# Patient Record
Sex: FEMALE | Race: WHITE | Employment: FULL TIME | URBAN - METROPOLITAN AREA
[De-identification: names, ages, dates, MRNs, and addresses within clinical notes are randomized per-mention and may not be internally consistent; named-entity substitution may affect disease eponyms.]

---

## 2018-03-19 ENCOUNTER — OFFICE VISIT (OUTPATIENT)
Dept: FAMILY MEDICINE CLINIC | Age: 53
End: 2018-03-19
Payer: COMMERCIAL

## 2018-03-19 VITALS
OXYGEN SATURATION: 98 % | DIASTOLIC BLOOD PRESSURE: 62 MMHG | WEIGHT: 173 LBS | BODY MASS INDEX: 28.82 KG/M2 | HEIGHT: 65 IN | TEMPERATURE: 97.6 F | SYSTOLIC BLOOD PRESSURE: 118 MMHG | HEART RATE: 54 BPM

## 2018-03-19 DIAGNOSIS — F33.41 RECURRENT MAJOR DEPRESSIVE DISORDER, IN PARTIAL REMISSION (HCC): ICD-10-CM

## 2018-03-19 DIAGNOSIS — F41.9 ANXIETY: Primary | ICD-10-CM

## 2018-03-19 PROCEDURE — G8417 CALC BMI ABV UP PARAM F/U: HCPCS | Performed by: INTERNAL MEDICINE

## 2018-03-19 PROCEDURE — G8427 DOCREV CUR MEDS BY ELIG CLIN: HCPCS | Performed by: INTERNAL MEDICINE

## 2018-03-19 PROCEDURE — 3014F SCREEN MAMMO DOC REV: CPT | Performed by: INTERNAL MEDICINE

## 2018-03-19 PROCEDURE — 99213 OFFICE O/P EST LOW 20 MIN: CPT | Performed by: INTERNAL MEDICINE

## 2018-03-19 PROCEDURE — 1036F TOBACCO NON-USER: CPT | Performed by: INTERNAL MEDICINE

## 2018-03-19 PROCEDURE — 3017F COLORECTAL CA SCREEN DOC REV: CPT | Performed by: INTERNAL MEDICINE

## 2018-03-19 PROCEDURE — G8482 FLU IMMUNIZE ORDER/ADMIN: HCPCS | Performed by: INTERNAL MEDICINE

## 2018-03-19 NOTE — PATIENT INSTRUCTIONS
and a nervous feeling. In an anxiety disorder, the symptoms are far more severe. Constant worry, muscle tension, trouble sleeping, nausea and diarrhea, and other symptoms can make normal daily activities difficult or impossible. These symptoms may occur for no reason, and they can affect your work, school, or social life. Medicines, counseling, and self-care can all help. Follow-up care is a key part of your treatment and safety. Be sure to make and go to all appointments, and call your doctor if you are having problems. It's also a good idea to know your test results and keep a list of the medicines you take. How can you care for yourself at home? · Take medicines exactly as directed. Call your doctor if you think you are having a problem with your medicine. · Go to your counseling sessions and follow-up appointments. · Recognize and accept your anxiety. Then, when you are in a situation that makes you anxious, say to yourself, \"This is not an emergency. I feel uncomfortable, but I am not in danger. I can keep going even if I feel anxious. \"  · Be kind to your body:  ¨ Relieve tension with exercise or a massage. ¨ Get enough rest.  ¨ Avoid alcohol, caffeine, nicotine, and illegal drugs. They can increase your anxiety level and cause sleep problems. ¨ Learn and do relaxation techniques. See below for more about these techniques. · Engage your mind. Get out and do something you enjoy. Go to a funny movie, or take a walk or hike. Plan your day. Having too much or too little to do can make you anxious. · Keep a record of your symptoms. Discuss your fears with a good friend or family member, or join a support group for people with similar problems. Talking to others sometimes relieves stress. · Get involved in social groups, or volunteer to help others. Being alone sometimes makes things seem worse than they are. · Get at least 30 minutes of exercise on most days of the week to relieve stress.  Walking is a yourself or someone else. ? Keep the numbers for these national suicide hotlines: 7-476-850-TALK (2-356.402.4146) and 9-563-WNOXQVK (5-631.610.1416). If you or someone you know talks about suicide or feeling hopeless, get help right away. ? Watch closely for changes in your health, and be sure to contact your doctor if:  ? · You have anxiety or fear that affects your life. ? · You have symptoms of anxiety that are new or different from those you had before. Where can you learn more? Go to https://[employer]. org and sign in to your [employer] account. Enter P754 in the [address] [employer] box to learn more about \"Anxiety Disorder: Care Instructions. \"     If you do not have an account, please click on the \"Sign Up Now\" link. Current as of: May 12, 2017  Content Version: 11.5  © 6529-4602 Healthwise, Incorporated. Care instructions adapted under license by [facility] ([facility]). If you have questions about a medical condition or this instruction, always ask your healthcare professional. [address] any warranty or liability for your use of this information.

## 2018-04-16 RX ORDER — DICYCLOMINE HYDROCHLORIDE 10 MG/1
10 CAPSULE ORAL 4 TIMES DAILY
Qty: 120 CAPSULE | Refills: 1 | Status: CANCELLED | OUTPATIENT
Start: 2018-04-16

## 2018-04-26 ENCOUNTER — HOSPITAL ENCOUNTER (OUTPATIENT)
Age: 53
Discharge: HOME | End: 2018-04-28
Payer: COMMERCIAL

## 2018-04-26 DIAGNOSIS — R63.5 WEIGHT GAIN: ICD-10-CM

## 2018-04-26 DIAGNOSIS — N95.1 PERIMENOPAUSAL SYMPTOMS: ICD-10-CM

## 2018-04-26 DIAGNOSIS — N92.6 IRREGULAR BLEEDING: ICD-10-CM

## 2018-04-26 LAB
CORTISOL TOTAL: 6.36 MCG/DL (ref 2.68–18.4)
ESTRADIOL LEVEL: 96.9 PG/ML
FOLLICLE STIMULATING HORMONE: 34.2 MIU/ML
HBA1C MFR BLD: 5.2 % (ref 4.8–5.9)
HCT VFR BLD CALC: 41.6 % (ref 34–48)
HEMOGLOBIN: 13.4 G/DL (ref 11.5–15.5)
MCH RBC QN AUTO: 31.1 PG (ref 26–35)
MCHC RBC AUTO-ENTMCNC: 32.2 % (ref 32–34.5)
MCV RBC AUTO: 96.5 FL (ref 80–99.9)
PDW BLD-RTO: 12.5 FL (ref 11.5–15)
PLATELET # BLD: 251 E9/L (ref 130–450)
PMV BLD AUTO: 11.4 FL (ref 7–12)
PROLACTIN: 25.05 NG/ML
RBC # BLD: 4.31 E12/L (ref 3.5–5.5)
T3 FREE: 2.8 PG/ML (ref 2–4.4)
T4 FREE: 1.25 NG/DL (ref 0.93–1.7)
TSH SERPL DL<=0.05 MIU/L-ACNC: 2.57 UIU/ML (ref 0.27–4.2)
WBC # BLD: 6 E9/L (ref 4.5–11.5)

## 2018-04-26 PROCEDURE — 85027 COMPLETE CBC AUTOMATED: CPT

## 2018-04-26 PROCEDURE — 84439 ASSAY OF FREE THYROXINE: CPT

## 2018-04-26 PROCEDURE — 88175 CYTOPATH C/V AUTO FLUID REDO: CPT

## 2018-04-26 PROCEDURE — 82306 VITAMIN D 25 HYDROXY: CPT

## 2018-04-26 PROCEDURE — 82533 TOTAL CORTISOL: CPT

## 2018-04-26 PROCEDURE — 84481 FREE ASSAY (FT-3): CPT

## 2018-04-26 PROCEDURE — 83001 ASSAY OF GONADOTROPIN (FSH): CPT

## 2018-04-26 PROCEDURE — 84443 ASSAY THYROID STIM HORMONE: CPT

## 2018-04-26 PROCEDURE — 84144 ASSAY OF PROGESTERONE: CPT

## 2018-04-26 PROCEDURE — 84270 ASSAY OF SEX HORMONE GLOBUL: CPT

## 2018-04-26 PROCEDURE — 83036 HEMOGLOBIN GLYCOSYLATED A1C: CPT

## 2018-04-26 PROCEDURE — 84146 ASSAY OF PROLACTIN: CPT

## 2018-04-26 PROCEDURE — 82670 ASSAY OF TOTAL ESTRADIOL: CPT

## 2018-04-26 PROCEDURE — 84403 ASSAY OF TOTAL TESTOSTERONE: CPT

## 2018-04-27 LAB — VITAMIN D 25-HYDROXY: 83 NG/ML (ref 30–100)

## 2018-04-28 LAB
PROGESTERONE LEVEL: 0.45 NG/ML
SEX HORMONE BINDING GLOBULIN: 131 NMOL/L (ref 30–135)
TESTOSTERONE FREE-NONMALE: <1 PG/ML (ref 0.6–3.8)
TESTOSTERONE TOTAL: 8 NG/DL (ref 20–70)

## 2018-05-30 ENCOUNTER — HOSPITAL ENCOUNTER (OUTPATIENT)
Age: 53
Discharge: HOME | End: 2018-06-01
Payer: COMMERCIAL

## 2018-05-30 DIAGNOSIS — Z32.02 PREGNANCY TEST NEGATIVE: ICD-10-CM

## 2018-05-30 PROCEDURE — 88305 TISSUE EXAM BY PATHOLOGIST: CPT

## 2018-05-30 PROCEDURE — 84146 ASSAY OF PROLACTIN: CPT

## 2018-05-31 LAB — PROLACTIN: 10.99 NG/ML

## 2018-06-25 ENCOUNTER — OFFICE VISIT (OUTPATIENT)
Dept: FAMILY MEDICINE CLINIC | Age: 53
End: 2018-06-25
Payer: COMMERCIAL

## 2018-06-25 VITALS
HEIGHT: 65 IN | SYSTOLIC BLOOD PRESSURE: 110 MMHG | BODY MASS INDEX: 28.49 KG/M2 | RESPIRATION RATE: 16 BRPM | OXYGEN SATURATION: 96 % | WEIGHT: 171 LBS | DIASTOLIC BLOOD PRESSURE: 68 MMHG | TEMPERATURE: 97.7 F | HEART RATE: 65 BPM

## 2018-06-25 DIAGNOSIS — F33.9 RECURRENT MAJOR DEPRESSIVE DISORDER, REMISSION STATUS UNSPECIFIED (HCC): ICD-10-CM

## 2018-06-25 PROCEDURE — 3017F COLORECTAL CA SCREEN DOC REV: CPT | Performed by: INTERNAL MEDICINE

## 2018-06-25 PROCEDURE — 1036F TOBACCO NON-USER: CPT | Performed by: INTERNAL MEDICINE

## 2018-06-25 PROCEDURE — G8417 CALC BMI ABV UP PARAM F/U: HCPCS | Performed by: INTERNAL MEDICINE

## 2018-06-25 PROCEDURE — 99213 OFFICE O/P EST LOW 20 MIN: CPT | Performed by: INTERNAL MEDICINE

## 2018-06-25 PROCEDURE — G8427 DOCREV CUR MEDS BY ELIG CLIN: HCPCS | Performed by: INTERNAL MEDICINE

## 2018-06-25 RX ORDER — ESCITALOPRAM OXALATE 10 MG/1
TABLET ORAL
Qty: 30 TABLET | Refills: 2 | Status: SHIPPED | OUTPATIENT
Start: 2018-06-25 | End: 2018-10-27 | Stop reason: SDUPTHER

## 2018-08-27 ENCOUNTER — OFFICE VISIT (OUTPATIENT)
Dept: FAMILY MEDICINE CLINIC | Age: 53
End: 2018-08-27
Payer: COMMERCIAL

## 2018-08-27 VITALS
HEIGHT: 65 IN | TEMPERATURE: 98.1 F | WEIGHT: 170 LBS | BODY MASS INDEX: 28.32 KG/M2 | HEART RATE: 55 BPM | OXYGEN SATURATION: 98 % | SYSTOLIC BLOOD PRESSURE: 112 MMHG | RESPIRATION RATE: 18 BRPM | DIASTOLIC BLOOD PRESSURE: 64 MMHG

## 2018-08-27 DIAGNOSIS — Z01.818 PRE-OP EXAMINATION: Primary | ICD-10-CM

## 2018-08-27 DIAGNOSIS — R94.31 ABNORMAL FINDING ON EKG: Primary | ICD-10-CM

## 2018-08-27 DIAGNOSIS — F32.89 OTHER DEPRESSION: ICD-10-CM

## 2018-08-27 PROCEDURE — G8427 DOCREV CUR MEDS BY ELIG CLIN: HCPCS | Performed by: INTERNAL MEDICINE

## 2018-08-27 PROCEDURE — 1036F TOBACCO NON-USER: CPT | Performed by: INTERNAL MEDICINE

## 2018-08-27 PROCEDURE — 3017F COLORECTAL CA SCREEN DOC REV: CPT | Performed by: INTERNAL MEDICINE

## 2018-08-27 PROCEDURE — G8417 CALC BMI ABV UP PARAM F/U: HCPCS | Performed by: INTERNAL MEDICINE

## 2018-08-27 PROCEDURE — 93000 ELECTROCARDIOGRAM COMPLETE: CPT | Performed by: INTERNAL MEDICINE

## 2018-08-27 PROCEDURE — 99213 OFFICE O/P EST LOW 20 MIN: CPT | Performed by: INTERNAL MEDICINE

## 2018-08-27 NOTE — PROGRESS NOTES
Patient:  Kalli Pavon  MRN: [id number]  Date of Service: 2018   1965      CHIEF COMPLAINT:    Chief Complaint   Patient presents with    Pre-op Exam     surgical clearance for d&c on        History Obtained From:  patient    HISTORY OF PRESENT ILLNESS:   The patient is a 46 y.o. female with prior history of Depression and anxiety is here for a pre op clearance for   Her upcoming Gyn surgery by Dr Giuliana Jimenes. She is to undergo a [email or web address] procedure next month and patient reports finding of Fibroid. No chest pain. No shortness of braeth. No abdominal pain. Past medical, surgical and family history reviewed and updated. Medications, allergies, and social history reviewed and updated. ROS:  Negative except for her complaints regarding her menstrual periods. Seeing Dr Giuliana Jimenes OB/Gyn specialist.    Physical Exam:      General appearance: alert, appears stated age and cooperative  Vitals:   Vitals:    18 1740   BP: 112/64   Pulse: 55   Resp: 18   Temp: 98.1 °F (36.7 °C)   TempSrc: Oral   SpO2: 98%   Weight: 170 lb (77.1 kg)   Height: 5' 5\" (1.651 m)     Skin: Skin color, texture, turgor normal. No rashes or lesions. HEENT: Head: Normocephalic, no lesions, without obvious abnormality. Head: Normal, normocephalic, atraumatic. Eye: Normal external eye, conjunctiva, lids cornea, ROSCOE. Ears: Normal TM's bilaterally. Normal auditory canals and external ears. Non-tender. Nose: Normal external nose, mucus membranes and septum. Neck / Thyroid: Supple, no masses, nodes, nodules or enlargement.   Neck: no adenopathy, no carotid bruit, no JVD, supple, symmetrical, trachea midline and thyroid not enlarged, symmetric, no tenderness/mass/nodules  Lungs: clear to auscultation bilaterally  Heart: regular rate and rhythm, S1, S2 normal, no murmur, click, rub or gallop  Abdomen: soft, non-tender; bowel sounds normal; no masses,  no organomegaly  Extremities: extremities normal, atraumatic, no cyanosis or procedure    Labs reviewed with patient. Medications reviewed with patient. All questions answered. Return to clinic in 4 months.     Thiagodoni Lester  7:36 PM  8/27/2018

## 2018-10-27 DIAGNOSIS — F33.9 RECURRENT MAJOR DEPRESSIVE DISORDER, REMISSION STATUS UNSPECIFIED (HCC): ICD-10-CM

## 2018-10-29 RX ORDER — ESCITALOPRAM OXALATE 10 MG/1
TABLET ORAL
Qty: 30 TABLET | Refills: 2 | Status: SHIPPED | OUTPATIENT
Start: 2018-10-29 | End: 2019-01-24 | Stop reason: SDUPTHER

## 2019-01-14 ENCOUNTER — OFFICE VISIT (OUTPATIENT)
Dept: FAMILY MEDICINE CLINIC | Age: 54
End: 2019-01-14
Payer: COMMERCIAL

## 2019-01-14 VITALS
SYSTOLIC BLOOD PRESSURE: 118 MMHG | RESPIRATION RATE: 16 BRPM | DIASTOLIC BLOOD PRESSURE: 66 MMHG | OXYGEN SATURATION: 97 % | HEART RATE: 61 BPM | BODY MASS INDEX: 27 KG/M2 | WEIGHT: 168 LBS | TEMPERATURE: 98.1 F | HEIGHT: 66 IN

## 2019-01-14 DIAGNOSIS — Z23 NEED FOR PNEUMOCOCCAL VACCINATION: ICD-10-CM

## 2019-01-14 DIAGNOSIS — F32.89 OTHER DEPRESSION: Primary | ICD-10-CM

## 2019-01-14 PROCEDURE — G8427 DOCREV CUR MEDS BY ELIG CLIN: HCPCS | Performed by: INTERNAL MEDICINE

## 2019-01-14 PROCEDURE — 90471 IMMUNIZATION ADMIN: CPT | Performed by: INTERNAL MEDICINE

## 2019-01-14 PROCEDURE — 99213 OFFICE O/P EST LOW 20 MIN: CPT | Performed by: INTERNAL MEDICINE

## 2019-01-14 PROCEDURE — 90732 PPSV23 VACC 2 YRS+ SUBQ/IM: CPT | Performed by: INTERNAL MEDICINE

## 2019-01-14 PROCEDURE — 1036F TOBACCO NON-USER: CPT | Performed by: INTERNAL MEDICINE

## 2019-01-14 PROCEDURE — G8417 CALC BMI ABV UP PARAM F/U: HCPCS | Performed by: INTERNAL MEDICINE

## 2019-01-14 PROCEDURE — 3017F COLORECTAL CA SCREEN DOC REV: CPT | Performed by: INTERNAL MEDICINE

## 2019-01-14 PROCEDURE — G8484 FLU IMMUNIZE NO ADMIN: HCPCS | Performed by: INTERNAL MEDICINE

## 2019-01-24 DIAGNOSIS — F33.9 RECURRENT MAJOR DEPRESSIVE DISORDER, REMISSION STATUS UNSPECIFIED (HCC): ICD-10-CM

## 2019-01-25 RX ORDER — ESCITALOPRAM OXALATE 10 MG/1
TABLET ORAL
Qty: 30 TABLET | Refills: 2 | Status: SHIPPED | OUTPATIENT
Start: 2019-01-25 | End: 2019-06-10

## 2019-02-18 ENCOUNTER — TELEPHONE (OUTPATIENT)
Dept: FAMILY MEDICINE CLINIC | Age: 54
End: 2019-02-18

## 2019-03-15 ENCOUNTER — HOSPITAL ENCOUNTER (OUTPATIENT)
Age: 54
Discharge: HOME | End: 2019-03-15
Payer: COMMERCIAL

## 2019-03-15 ENCOUNTER — OFFICE VISIT (OUTPATIENT)
Dept: FAMILY MEDICINE CLINIC | Age: 54
End: 2019-03-15
Payer: COMMERCIAL

## 2019-03-15 VITALS
HEART RATE: 73 BPM | WEIGHT: 155 LBS | TEMPERATURE: 98 F | DIASTOLIC BLOOD PRESSURE: 70 MMHG | BODY MASS INDEX: 24.91 KG/M2 | SYSTOLIC BLOOD PRESSURE: 110 MMHG | HEIGHT: 66 IN | OXYGEN SATURATION: 98 %

## 2019-03-15 DIAGNOSIS — E78.5 HYPERLIPIDEMIA, UNSPECIFIED HYPERLIPIDEMIA TYPE: ICD-10-CM

## 2019-03-15 DIAGNOSIS — F32.89 OTHER DEPRESSION: Primary | ICD-10-CM

## 2019-03-15 DIAGNOSIS — Z12.39 SCREENING FOR BREAST CANCER: ICD-10-CM

## 2019-03-15 DIAGNOSIS — F32.89 OTHER DEPRESSION: ICD-10-CM

## 2019-03-15 LAB
ALBUMIN SERPL-MCNC: 4.8 G/DL (ref 3.5–5.2)
ALP BLD-CCNC: 46 U/L (ref 35–104)
ALT SERPL-CCNC: 18 U/L (ref 0–32)
ANION GAP SERPL CALCULATED.3IONS-SCNC: 14 MMOL/L (ref 7–16)
AST SERPL-CCNC: 22 U/L (ref 0–31)
BASOPHILS ABSOLUTE: 0.09 E9/L (ref 0–0.2)
BASOPHILS RELATIVE PERCENT: 1.8 % (ref 0–2)
BILIRUB SERPL-MCNC: 0.7 MG/DL (ref 0–1.2)
BUN BLDV-MCNC: 13 MG/DL (ref 6–20)
CALCIUM SERPL-MCNC: 10.1 MG/DL (ref 8.6–10.2)
CHLORIDE BLD-SCNC: 102 MMOL/L (ref 98–107)
CHOLESTEROL, TOTAL: 212 MG/DL (ref 0–199)
CO2: 26 MMOL/L (ref 22–29)
CREAT SERPL-MCNC: 0.8 MG/DL (ref 0.5–1)
EOSINOPHILS ABSOLUTE: 0.1 E9/L (ref 0.05–0.5)
EOSINOPHILS RELATIVE PERCENT: 2 % (ref 0–6)
GFR AFRICAN AMERICAN: >60
GFR NON-AFRICAN AMERICAN: >60 ML/MIN/1.73
GLUCOSE BLD-MCNC: 97 MG/DL (ref 74–99)
HCT VFR BLD CALC: 45.4 % (ref 34–48)
HDLC SERPL-MCNC: 82 MG/DL
HEMOGLOBIN: 15 G/DL (ref 11.5–15.5)
IMMATURE GRANULOCYTES #: 0.01 E9/L
IMMATURE GRANULOCYTES %: 0.2 % (ref 0–5)
LDL CHOLESTEROL CALCULATED: 119 MG/DL (ref 0–99)
LYMPHOCYTES ABSOLUTE: 1.27 E9/L (ref 1.5–4)
LYMPHOCYTES RELATIVE PERCENT: 25.2 % (ref 20–42)
MCH RBC QN AUTO: 31.6 PG (ref 26–35)
MCHC RBC AUTO-ENTMCNC: 33 % (ref 32–34.5)
MCV RBC AUTO: 95.8 FL (ref 80–99.9)
MONOCYTES ABSOLUTE: 0.59 E9/L (ref 0.1–0.95)
MONOCYTES RELATIVE PERCENT: 11.7 % (ref 2–12)
NEUTROPHILS ABSOLUTE: 2.98 E9/L (ref 1.8–7.3)
NEUTROPHILS RELATIVE PERCENT: 59.1 % (ref 43–80)
PDW BLD-RTO: 12.4 FL (ref 11.5–15)
PLATELET # BLD: 231 E9/L (ref 130–450)
PMV BLD AUTO: 10.3 FL (ref 7–12)
POTASSIUM SERPL-SCNC: 4.2 MMOL/L (ref 3.5–5)
RBC # BLD: 4.74 E12/L (ref 3.5–5.5)
SODIUM BLD-SCNC: 142 MMOL/L (ref 132–146)
TOTAL PROTEIN: 7.8 G/DL (ref 6.4–8.3)
TRIGL SERPL-MCNC: 57 MG/DL (ref 0–149)
VLDLC SERPL CALC-MCNC: 11 MG/DL
WBC # BLD: 5 E9/L (ref 4.5–11.5)

## 2019-03-15 PROCEDURE — G8484 FLU IMMUNIZE NO ADMIN: HCPCS | Performed by: INTERNAL MEDICINE

## 2019-03-15 PROCEDURE — 1036F TOBACCO NON-USER: CPT | Performed by: INTERNAL MEDICINE

## 2019-03-15 PROCEDURE — 3017F COLORECTAL CA SCREEN DOC REV: CPT | Performed by: INTERNAL MEDICINE

## 2019-03-15 PROCEDURE — 99213 OFFICE O/P EST LOW 20 MIN: CPT | Performed by: INTERNAL MEDICINE

## 2019-03-15 PROCEDURE — 80061 LIPID PANEL: CPT

## 2019-03-15 PROCEDURE — 85025 COMPLETE CBC W/AUTO DIFF WBC: CPT

## 2019-03-15 PROCEDURE — 80053 COMPREHEN METABOLIC PANEL: CPT

## 2019-03-15 PROCEDURE — 36415 COLL VENOUS BLD VENIPUNCTURE: CPT

## 2019-03-15 PROCEDURE — G8417 CALC BMI ABV UP PARAM F/U: HCPCS | Performed by: INTERNAL MEDICINE

## 2019-03-15 PROCEDURE — G8428 CUR MEDS NOT DOCUMENT: HCPCS | Performed by: INTERNAL MEDICINE

## 2019-03-17 DIAGNOSIS — F32.89 OTHER DEPRESSION: Primary | ICD-10-CM

## 2019-03-21 ENCOUNTER — HOSPITAL ENCOUNTER (OUTPATIENT)
Dept: GENERAL RADIOLOGY | Age: 54
Discharge: HOME | End: 2019-03-23
Payer: COMMERCIAL

## 2019-03-21 DIAGNOSIS — Z12.39 SCREENING FOR BREAST CANCER: ICD-10-CM

## 2019-03-21 PROCEDURE — 77063 BREAST TOMOSYNTHESIS BI: CPT

## 2019-05-09 ENCOUNTER — HOSPITAL ENCOUNTER (OUTPATIENT)
Age: 54
Discharge: HOME | End: 2019-05-11
Payer: COMMERCIAL

## 2019-05-09 DIAGNOSIS — R53.83 OTHER FATIGUE: ICD-10-CM

## 2019-05-09 DIAGNOSIS — N95.1 PERIMENOPAUSAL: ICD-10-CM

## 2019-05-09 DIAGNOSIS — R63.5 WEIGHT GAIN: ICD-10-CM

## 2019-05-09 DIAGNOSIS — N92.6 IRREGULAR MENSES: ICD-10-CM

## 2019-05-09 DIAGNOSIS — R45.86 MOOD SWINGS: ICD-10-CM

## 2019-05-09 LAB
CORTISOL TOTAL: 7.6 MCG/DL (ref 2.68–18.4)
ESTRADIOL LEVEL: 35.2 PG/ML
FOLLICLE STIMULATING HORMONE: 32.7 MIU/ML
HBA1C MFR BLD: 5.2 % (ref 4–5.6)
T3 FREE: 2.5 PG/ML (ref 2–4.4)
T4 FREE: 1.19 NG/DL (ref 0.93–1.7)
TSH SERPL DL<=0.05 MIU/L-ACNC: 2.74 UIU/ML (ref 0.27–4.2)
VITAMIN D 25-HYDROXY: 83 NG/ML (ref 30–100)

## 2019-05-09 PROCEDURE — 83001 ASSAY OF GONADOTROPIN (FSH): CPT

## 2019-05-09 PROCEDURE — 84439 ASSAY OF FREE THYROXINE: CPT

## 2019-05-09 PROCEDURE — 84443 ASSAY THYROID STIM HORMONE: CPT

## 2019-05-09 PROCEDURE — 84140 ASSAY OF PREGNENOLONE: CPT

## 2019-05-09 PROCEDURE — 82627 DEHYDROEPIANDROSTERONE: CPT

## 2019-05-09 PROCEDURE — 84144 ASSAY OF PROGESTERONE: CPT

## 2019-05-09 PROCEDURE — 84481 FREE ASSAY (FT-3): CPT

## 2019-05-09 PROCEDURE — 82670 ASSAY OF TOTAL ESTRADIOL: CPT

## 2019-05-09 PROCEDURE — 83036 HEMOGLOBIN GLYCOSYLATED A1C: CPT

## 2019-05-09 PROCEDURE — 82533 TOTAL CORTISOL: CPT

## 2019-05-09 PROCEDURE — 82306 VITAMIN D 25 HYDROXY: CPT

## 2019-05-11 LAB — DHEAS (DHEA SULFATE): 116 UG/DL (ref 26–200)

## 2019-05-12 LAB — PROGESTERONE LEVEL: 0.16 NG/ML

## 2019-05-17 LAB
Lab: NORMAL
REPORT: NORMAL
THIS TEST SENT TO: NORMAL

## 2019-06-10 ENCOUNTER — HOSPITAL ENCOUNTER (OUTPATIENT)
Age: 54
Discharge: HOME | End: 2019-06-12
Payer: COMMERCIAL

## 2019-06-10 PROCEDURE — 88175 CYTOPATH C/V AUTO FLUID REDO: CPT

## 2019-07-29 ENCOUNTER — OFFICE VISIT (OUTPATIENT)
Dept: FAMILY MEDICINE CLINIC | Age: 54
End: 2019-07-29
Payer: COMMERCIAL

## 2019-07-29 VITALS
SYSTOLIC BLOOD PRESSURE: 110 MMHG | OXYGEN SATURATION: 97 % | WEIGHT: 156 LBS | HEART RATE: 50 BPM | HEIGHT: 66 IN | BODY MASS INDEX: 25.07 KG/M2 | TEMPERATURE: 97.9 F | DIASTOLIC BLOOD PRESSURE: 62 MMHG

## 2019-07-29 DIAGNOSIS — F32.89 OTHER DEPRESSION: ICD-10-CM

## 2019-07-29 DIAGNOSIS — E78.5 HYPERLIPIDEMIA, UNSPECIFIED HYPERLIPIDEMIA TYPE: Primary | ICD-10-CM

## 2019-07-29 PROCEDURE — 1036F TOBACCO NON-USER: CPT | Performed by: INTERNAL MEDICINE

## 2019-07-29 PROCEDURE — 99213 OFFICE O/P EST LOW 20 MIN: CPT | Performed by: INTERNAL MEDICINE

## 2019-07-29 PROCEDURE — 3017F COLORECTAL CA SCREEN DOC REV: CPT | Performed by: INTERNAL MEDICINE

## 2019-07-29 PROCEDURE — G8417 CALC BMI ABV UP PARAM F/U: HCPCS | Performed by: INTERNAL MEDICINE

## 2019-07-29 PROCEDURE — G8427 DOCREV CUR MEDS BY ELIG CLIN: HCPCS | Performed by: INTERNAL MEDICINE

## 2019-07-29 ASSESSMENT — PATIENT HEALTH QUESTIONNAIRE - PHQ9
2. FEELING DOWN, DEPRESSED OR HOPELESS: 0
SUM OF ALL RESPONSES TO PHQ QUESTIONS 1-9: 0
SUM OF ALL RESPONSES TO PHQ QUESTIONS 1-9: 0
SUM OF ALL RESPONSES TO PHQ9 QUESTIONS 1 & 2: 0
1. LITTLE INTEREST OR PLEASURE IN DOING THINGS: 0

## 2019-07-29 NOTE — PROGRESS NOTES
answered. Side effects and benefits of Lexapro explained. Pt seeing local Gyn in town. Labs reviewed with patient. Medications reviewed with patient. All questions answered.   Return to clinic in December 2019    Jennie Urbano  7:02 PM  7/29/2019

## 2019-09-09 ENCOUNTER — OFFICE VISIT (OUTPATIENT)
Dept: SURGERY | Age: 54
End: 2019-09-09
Payer: COMMERCIAL

## 2019-09-09 VITALS
WEIGHT: 160 LBS | DIASTOLIC BLOOD PRESSURE: 64 MMHG | BODY MASS INDEX: 25.71 KG/M2 | RESPIRATION RATE: 18 BRPM | SYSTOLIC BLOOD PRESSURE: 114 MMHG | HEIGHT: 66 IN | TEMPERATURE: 97.5 F | OXYGEN SATURATION: 97 % | HEART RATE: 63 BPM

## 2019-09-09 DIAGNOSIS — K59.09 CHRONIC CONSTIPATION: Primary | ICD-10-CM

## 2019-09-09 PROCEDURE — G8428 CUR MEDS NOT DOCUMENT: HCPCS | Performed by: SURGERY

## 2019-09-09 PROCEDURE — 99203 OFFICE O/P NEW LOW 30 MIN: CPT | Performed by: SURGERY

## 2019-09-09 PROCEDURE — G8417 CALC BMI ABV UP PARAM F/U: HCPCS | Performed by: SURGERY

## 2019-09-09 RX ORDER — LUBIPROSTONE 8 UG/1
8 CAPSULE, GELATIN COATED ORAL 2 TIMES DAILY WITH MEALS
Qty: 60 CAPSULE | Refills: 3 | Status: SHIPPED | OUTPATIENT
Start: 2019-09-09 | End: 2019-11-04

## 2019-09-09 NOTE — PROGRESS NOTES
MG tablet TAKE 1 TABLET DAILY 90 tablet 0    escitalopram (LEXAPRO) 20 MG tablet       progesterone (PROMETRIUM) 200 MG capsule Take 1 capsule by mouth daily take 1 capsule by mouth at bedtime 90 capsule 1    estradiol 0.025 MG/24HR PTTW APPLY 1 PATCH TWICE A WEEK 24 patch 1    fluconazole (DIFLUCAN) 150 MG tablet take 1 tablet by mouth every MONTH 12 tablet 0    Omega-3 Krill Oil 300 MG CAPS Take 350 mg by mouth once      vitamin E 1000 UNITS capsule Take 400 Units by mouth 2 times daily       Biotin 10 MG tablet Take 5,000 mg by mouth daily       ranitidine (ZANTAC) 150 MG tablet Take 300 mg by mouth once       albuterol (PROVENTIL HFA;VENTOLIN HFA) 108 (90 BASE) MCG/ACT inhaler Inhale 2 puffs into the lungs every 6 hours as needed. 1 Inhaler 2    vitamin D (CHOLECALCIFEROL) 1000 UNIT TABS tablet Take 400 Units by mouth daily       Multiple Vitamin (MULTI VITAMIN DAILY PO) Take by mouth 2 times daily Indications: bid       Probiotic Product (PROBIOTIC DAILY PO) Take by mouth 2 times daily       terconazole (TERAZOL 7) 0.4 % vaginal cream Place vaginally nightly x 7 days (Patient not taking: Reported on 9/9/2019) 7 Tube 0    dicyclomine (BENTYL) 10 MG capsule Take 1 capsule by mouth 4 times daily (Patient not taking: Reported on 9/9/2019) 120 capsule 1     No current facility-administered medications for this visit.           Social History:   Social History     Tobacco Use    Smoking status: Never Smoker    Smokeless tobacco: Never Used   Substance Use Topics    Alcohol use: Not Currently     Alcohol/week: 0.0 standard drinks     Comment: Social use        Family History:   Family History   Problem Relation Age of Onset    Heart Attack Father     Coronary Art Dis Father     Breast Cancer Paternal Grandmother        REVIEW OF SYSTEMS:    Constitutional: negative  Eyes: negative  Ears, nose, mouth, throat, and face: negative  Respiratory: negative  Cardiovascular: negative  Gastrointestinal: as in

## 2019-10-02 DIAGNOSIS — K58.9 IRRITABLE BOWEL SYNDROME, UNSPECIFIED TYPE: Primary | ICD-10-CM

## 2019-10-02 DIAGNOSIS — R14.0 BLOATING: ICD-10-CM

## 2019-10-03 ENCOUNTER — TELEPHONE (OUTPATIENT)
Dept: SURGERY | Age: 54
End: 2019-10-03

## 2019-10-14 ENCOUNTER — TELEPHONE (OUTPATIENT)
Dept: ADMINISTRATIVE | Age: 54
End: 2019-10-14

## 2019-11-04 ENCOUNTER — OFFICE VISIT (OUTPATIENT)
Dept: SURGERY | Age: 54
End: 2019-11-04
Payer: COMMERCIAL

## 2019-11-04 VITALS
BODY MASS INDEX: 26.71 KG/M2 | WEIGHT: 166.2 LBS | SYSTOLIC BLOOD PRESSURE: 100 MMHG | DIASTOLIC BLOOD PRESSURE: 58 MMHG | HEIGHT: 66 IN | OXYGEN SATURATION: 98 % | HEART RATE: 55 BPM | TEMPERATURE: 97.7 F

## 2019-11-04 DIAGNOSIS — K58.1 IRRITABLE BOWEL SYNDROME WITH CONSTIPATION: Primary | ICD-10-CM

## 2019-11-04 PROCEDURE — G8484 FLU IMMUNIZE NO ADMIN: HCPCS | Performed by: SURGERY

## 2019-11-04 PROCEDURE — G8417 CALC BMI ABV UP PARAM F/U: HCPCS | Performed by: SURGERY

## 2019-11-04 PROCEDURE — 3017F COLORECTAL CA SCREEN DOC REV: CPT | Performed by: SURGERY

## 2019-11-04 PROCEDURE — 1036F TOBACCO NON-USER: CPT | Performed by: SURGERY

## 2019-11-04 PROCEDURE — G8428 CUR MEDS NOT DOCUMENT: HCPCS | Performed by: SURGERY

## 2019-11-04 PROCEDURE — 99213 OFFICE O/P EST LOW 20 MIN: CPT | Performed by: SURGERY

## 2019-11-04 RX ORDER — LUBIPROSTONE 24 UG/1
24 CAPSULE, GELATIN COATED ORAL 2 TIMES DAILY WITH MEALS
Qty: 60 CAPSULE | Refills: 3 | Status: SHIPPED | OUTPATIENT
Start: 2019-11-04 | End: 2020-01-06

## 2019-12-02 ENCOUNTER — OFFICE VISIT (OUTPATIENT)
Dept: SURGERY | Age: 54
End: 2019-12-02
Payer: COMMERCIAL

## 2019-12-02 VITALS
TEMPERATURE: 98.1 F | OXYGEN SATURATION: 98 % | SYSTOLIC BLOOD PRESSURE: 110 MMHG | HEIGHT: 66 IN | RESPIRATION RATE: 16 BRPM | DIASTOLIC BLOOD PRESSURE: 74 MMHG | WEIGHT: 169 LBS | HEART RATE: 59 BPM | BODY MASS INDEX: 27.16 KG/M2

## 2019-12-02 DIAGNOSIS — K58.1 IRRITABLE BOWEL SYNDROME WITH CONSTIPATION: Primary | ICD-10-CM

## 2019-12-02 PROCEDURE — 99213 OFFICE O/P EST LOW 20 MIN: CPT | Performed by: SURGERY

## 2019-12-02 PROCEDURE — 3017F COLORECTAL CA SCREEN DOC REV: CPT | Performed by: SURGERY

## 2019-12-02 PROCEDURE — G8417 CALC BMI ABV UP PARAM F/U: HCPCS | Performed by: SURGERY

## 2019-12-02 PROCEDURE — G8428 CUR MEDS NOT DOCUMENT: HCPCS | Performed by: SURGERY

## 2019-12-02 PROCEDURE — 1036F TOBACCO NON-USER: CPT | Performed by: SURGERY

## 2019-12-02 PROCEDURE — G8484 FLU IMMUNIZE NO ADMIN: HCPCS | Performed by: SURGERY

## 2020-01-06 ENCOUNTER — OFFICE VISIT (OUTPATIENT)
Dept: FAMILY MEDICINE CLINIC | Age: 55
End: 2020-01-06
Payer: COMMERCIAL

## 2020-01-06 VITALS
DIASTOLIC BLOOD PRESSURE: 70 MMHG | HEART RATE: 81 BPM | WEIGHT: 169.2 LBS | HEIGHT: 66 IN | OXYGEN SATURATION: 98 % | RESPIRATION RATE: 18 BRPM | SYSTOLIC BLOOD PRESSURE: 110 MMHG | BODY MASS INDEX: 27.19 KG/M2 | TEMPERATURE: 98 F

## 2020-01-06 PROCEDURE — 1036F TOBACCO NON-USER: CPT | Performed by: INTERNAL MEDICINE

## 2020-01-06 PROCEDURE — G8417 CALC BMI ABV UP PARAM F/U: HCPCS | Performed by: INTERNAL MEDICINE

## 2020-01-06 PROCEDURE — 99213 OFFICE O/P EST LOW 20 MIN: CPT | Performed by: INTERNAL MEDICINE

## 2020-01-06 PROCEDURE — G8427 DOCREV CUR MEDS BY ELIG CLIN: HCPCS | Performed by: INTERNAL MEDICINE

## 2020-01-06 PROCEDURE — 3017F COLORECTAL CA SCREEN DOC REV: CPT | Performed by: INTERNAL MEDICINE

## 2020-01-06 PROCEDURE — G8484 FLU IMMUNIZE NO ADMIN: HCPCS | Performed by: INTERNAL MEDICINE

## 2020-01-06 ASSESSMENT — PATIENT HEALTH QUESTIONNAIRE - PHQ9
2. FEELING DOWN, DEPRESSED OR HOPELESS: 0
SUM OF ALL RESPONSES TO PHQ QUESTIONS 1-9: 0
SUM OF ALL RESPONSES TO PHQ QUESTIONS 1-9: 0
1. LITTLE INTEREST OR PLEASURE IN DOING THINGS: 0
SUM OF ALL RESPONSES TO PHQ9 QUESTIONS 1 & 2: 0

## 2020-01-07 NOTE — PROGRESS NOTES
Patient:  Keely Resendiz  MRN: [id number]  Date of Service: 2020   1965      CHIEF COMPLAINT:    Chief Complaint   Patient presents with    Depression     6 mos follow up / doing well    Hip Pain     LT hip pain radiating down Lt lower extremity x few yrs / worse now       History Obtained From:  patient    HISTORY OF PRESENT ILLNESS:   The patient is a 53 y.o. female with prior history of Hyperlipidemia and Depression is here for a routine check up. No chest pain. Breathing is fine. No abdominal pain. Pain is in the left hip and radiates to the left lower extremity. Past medical, surgical and family history reviewed and updated. Medications, allergies, and social history reviewed and updated. ROS:  Negative except for left hip pain    Physical Exam:      General appearance: alert, appears stated age and cooperative  Vitals:   Vitals:    20 1643   BP: 110/70   Pulse: 81   Resp: 18   Temp: 98 °F (36.7 °C)   TempSrc: Oral   SpO2: 98%   Weight: 169 lb 3.2 oz (76.7 kg)   Height: 5' 6\" (1.676 m)     Skin: Skin color, texture, turgor normal. No rashes or lesions. HEENT: Head: Normocephalic, no lesions, without obvious abnormality. Head: Normal, normocephalic, atraumatic. Eye: Normal external eye, conjunctiva, lids cornea, ROSCOE. Ears: Normal TM's bilaterally. Normal auditory canals and external ears. Non-tender. Nose: Normal external nose, mucus membranes and septum. Neck / Thyroid: Supple, no masses, nodes, nodules or enlargement.   Neck: no adenopathy, no carotid bruit, no JVD, supple, symmetrical, trachea midline and thyroid not enlarged, symmetric, no tenderness/mass/nodules  Lungs: clear to auscultation bilaterally  Heart: regular rate and rhythm, S1, S2 normal, no murmur, click, rub or gallop  Abdomen: soft, non-tender; bowel sounds normal; no masses,  no organomegaly  Extremities: extremities normal, atraumatic, no cyanosis or edema  Neurologic: Mental status: Alert, oriented, thought

## 2020-01-27 ENCOUNTER — OFFICE VISIT (OUTPATIENT)
Dept: SURGERY | Age: 55
End: 2020-01-27
Payer: COMMERCIAL

## 2020-01-27 VITALS
HEART RATE: 50 BPM | OXYGEN SATURATION: 99 % | HEIGHT: 66 IN | DIASTOLIC BLOOD PRESSURE: 65 MMHG | WEIGHT: 174 LBS | BODY MASS INDEX: 27.97 KG/M2 | SYSTOLIC BLOOD PRESSURE: 117 MMHG | RESPIRATION RATE: 22 BRPM | TEMPERATURE: 97.7 F

## 2020-01-27 PROCEDURE — 99213 OFFICE O/P EST LOW 20 MIN: CPT | Performed by: SURGERY

## 2020-01-27 PROCEDURE — G8427 DOCREV CUR MEDS BY ELIG CLIN: HCPCS | Performed by: SURGERY

## 2020-01-27 PROCEDURE — 3017F COLORECTAL CA SCREEN DOC REV: CPT | Performed by: SURGERY

## 2020-01-27 PROCEDURE — G8417 CALC BMI ABV UP PARAM F/U: HCPCS | Performed by: SURGERY

## 2020-01-27 PROCEDURE — 1036F TOBACCO NON-USER: CPT | Performed by: SURGERY

## 2020-01-27 PROCEDURE — G8484 FLU IMMUNIZE NO ADMIN: HCPCS | Performed by: SURGERY

## 2020-01-27 NOTE — PROGRESS NOTES
Progress Note - Follow up    Patient's Name/Date of Birth: Eileen Velasco / 1965    Date: 1/27/2020    PCP: Deacon Gibbons MD    Referring Physician:   Damien Piedra MD  [phone]    Chief Complaint   Patient presents with    Irritable Bowel Syndrome     no issues per pt on 8 week follow up       HPI:  She is taking Linzess and magnesium oxide and she said she feels much better. She has a BM every day. She is less bloated    Patient's medications, allergies, past medical, surgical, social and family histories were reviewed and updated as appropriate. Review of Systems  Constitutional: negative  Respiratory: negative  Cardiovascular: negative  Gastrointestinal: as in hpi  Genitourinary:negative  Integument/breast: negative    Physical Exam:  Vitals:    01/27/20 1654   BP: 117/65   Pulse: 50   Resp: 22   Temp: 97.7 °F (36.5 °C)   SpO2: 99%       General appearance: alert, cooperative and in no acute distress. Lungs: clear to auscultation bilaterally  Heart: regular rate and rhythm  Abdomen: soft, non-tender, no masses,  no organomegaly  Musculoskeletal: symmetrical without clubbing cyanosis or edema.   Skin: normal    Data Reviewed:   Pathology: n/a    Impression/Plan:  53y.o. year old female with IBS-C    Continue Linzess  Continue Mag oxide  PRN suppositories  Follow up as needed    Electronically by Bishnu Fabian MD, General Surgery  on 1/27/2020 at 5:50 PM      Send copy of H&P to PCP, Decaon Gibbons MD and referring physician, Damien Piedra MD

## 2020-01-27 NOTE — LETTER
[facility] General Surgery  [address] [place] [address], [address] [facility]  [address] [id number]  Phone: [phone]  Fax: [phone]    Telma Fish MD        January 29, 2020       Patient: Sary Flores   MR Number: [id number]   YOB: 1965   Date of Visit: 1/27/2020       Dear Dr. Stacy Carbajal: Thank you for the request for consultation for Jose David Baez to me for the evaluation of her GI issues. Below are the relevant portions of my assessment and plan of care. Impression/Plan:  53y.o. year old female with IBS-C    Continue Linzess  Continue Mag oxide  PRN suppositories  Follow up as needed      If you have questions, please do not hesitate to call me. I look forward to following Alex Dickerson along with you.     Sincerely,        Radha Hernandez MD    CC providers:  Jarred Jauregui MD  [address] [place] [address]  VIA In Basket

## 2020-01-28 ENCOUNTER — TELEPHONE (OUTPATIENT)
Dept: SURGERY | Age: 55
End: 2020-01-28

## 2020-01-28 NOTE — TELEPHONE ENCOUNTER
MA called in 1 yr supply of linzess per Dr. Pop Congress to [facility] for Pt. Ma LVM with Pt.       Electronically signed by Saida Abraham MA on 1/28/2020 at 4:13 PM

## 2020-03-12 ENCOUNTER — HOSPITAL ENCOUNTER (OUTPATIENT)
Age: 55
Discharge: HOME | End: 2020-03-14
Payer: COMMERCIAL

## 2020-03-12 LAB
ESTRADIOL LEVEL: 12.1 PG/ML
FOLLICLE STIMULATING HORMONE: 52.5 MIU/ML
TSH SERPL DL<=0.05 MIU/L-ACNC: 1.57 UIU/ML (ref 0.27–4.2)

## 2020-03-12 PROCEDURE — 83001 ASSAY OF GONADOTROPIN (FSH): CPT

## 2020-03-12 PROCEDURE — 82533 TOTAL CORTISOL: CPT

## 2020-03-12 PROCEDURE — 82670 ASSAY OF TOTAL ESTRADIOL: CPT

## 2020-03-12 PROCEDURE — 84439 ASSAY OF FREE THYROXINE: CPT

## 2020-03-12 PROCEDURE — 84443 ASSAY THYROID STIM HORMONE: CPT

## 2020-03-12 PROCEDURE — 84140 ASSAY OF PREGNENOLONE: CPT

## 2020-03-12 PROCEDURE — 84144 ASSAY OF PROGESTERONE: CPT

## 2020-03-12 PROCEDURE — 82627 DEHYDROEPIANDROSTERONE: CPT

## 2020-03-12 PROCEDURE — 83036 HEMOGLOBIN GLYCOSYLATED A1C: CPT

## 2020-03-12 PROCEDURE — 84270 ASSAY OF SEX HORMONE GLOBUL: CPT

## 2020-03-12 PROCEDURE — 84481 FREE ASSAY (FT-3): CPT

## 2020-03-12 PROCEDURE — 84403 ASSAY OF TOTAL TESTOSTERONE: CPT

## 2020-03-13 LAB
CORTISOL TOTAL: 7.01 MCG/DL (ref 2.68–18.4)
HBA1C MFR BLD: 5 % (ref 4–5.6)
T3 FREE: 2.5 PG/ML (ref 2–4.4)
T4 FREE: 1.13 NG/DL (ref 0.93–1.7)

## 2020-03-14 LAB
DHEAS (DHEA SULFATE): 96 UG/DL (ref 26–200)
PROGESTERONE LEVEL: <0.05 NG/ML
SEX HORMONE BINDING GLOBULIN: 116 NMOL/L (ref 30–135)
TESTOSTERONE FREE-NONMALE: <1 PG/ML (ref 0.6–3.8)
TESTOSTERONE TOTAL: 10 NG/DL (ref 20–70)

## 2020-03-17 LAB — PREGNENOLONE: 17 NG/DL (ref 15–132)

## 2020-05-21 ENCOUNTER — HOSPITAL ENCOUNTER (OUTPATIENT)
Age: 55
Discharge: HOME | End: 2020-05-23
Payer: COMMERCIAL

## 2020-05-21 LAB
ALBUMIN SERPL-MCNC: 4.5 G/DL (ref 3.5–5.2)
ALP BLD-CCNC: 72 U/L (ref 35–104)
ALT SERPL-CCNC: 24 U/L (ref 0–32)
ANION GAP SERPL CALCULATED.3IONS-SCNC: 8 MMOL/L (ref 7–16)
AST SERPL-CCNC: 31 U/L (ref 0–31)
BILIRUB SERPL-MCNC: 0.3 MG/DL (ref 0–1.2)
BUN BLDV-MCNC: 20 MG/DL (ref 6–20)
CALCIUM SERPL-MCNC: 9.3 MG/DL (ref 8.6–10.2)
CHLORIDE BLD-SCNC: 104 MMOL/L (ref 98–107)
CHOLESTEROL, TOTAL: 203 MG/DL (ref 0–199)
CO2: 27 MMOL/L (ref 22–29)
CORTISOL TOTAL: 5.36 MCG/DL (ref 2.68–18.4)
CREAT SERPL-MCNC: 0.7 MG/DL (ref 0.5–1)
ESTRADIOL LEVEL: 18.8 PG/ML
FOLLICLE STIMULATING HORMONE: 33.4 MIU/ML
GFR AFRICAN AMERICAN: >60
GFR NON-AFRICAN AMERICAN: >60 ML/MIN/1.73
GLUCOSE BLD-MCNC: 78 MG/DL (ref 74–99)
HDLC SERPL-MCNC: 67 MG/DL
LDL CHOLESTEROL CALCULATED: 121 MG/DL (ref 0–99)
POTASSIUM SERPL-SCNC: 4.2 MMOL/L (ref 3.5–5)
SODIUM BLD-SCNC: 139 MMOL/L (ref 132–146)
T3 FREE: 2.5 PG/ML (ref 2–4.4)
T4 FREE: 1.07 NG/DL (ref 0.93–1.7)
TOTAL PROTEIN: 7.2 G/DL (ref 6.4–8.3)
TRIGL SERPL-MCNC: 77 MG/DL (ref 0–149)
TSH SERPL DL<=0.05 MIU/L-ACNC: 2.67 UIU/ML (ref 0.27–4.2)
VLDLC SERPL CALC-MCNC: 15 MG/DL

## 2020-05-21 PROCEDURE — 84403 ASSAY OF TOTAL TESTOSTERONE: CPT

## 2020-05-21 PROCEDURE — 84439 ASSAY OF FREE THYROXINE: CPT

## 2020-05-21 PROCEDURE — 82306 VITAMIN D 25 HYDROXY: CPT

## 2020-05-21 PROCEDURE — 84481 FREE ASSAY (FT-3): CPT

## 2020-05-21 PROCEDURE — 84144 ASSAY OF PROGESTERONE: CPT

## 2020-05-21 PROCEDURE — 84270 ASSAY OF SEX HORMONE GLOBUL: CPT

## 2020-05-21 PROCEDURE — 84443 ASSAY THYROID STIM HORMONE: CPT

## 2020-05-21 PROCEDURE — 80061 LIPID PANEL: CPT

## 2020-05-21 PROCEDURE — 82670 ASSAY OF TOTAL ESTRADIOL: CPT

## 2020-05-21 PROCEDURE — 82627 DEHYDROEPIANDROSTERONE: CPT

## 2020-05-21 PROCEDURE — 82533 TOTAL CORTISOL: CPT

## 2020-05-21 PROCEDURE — 80053 COMPREHEN METABOLIC PANEL: CPT

## 2020-05-21 PROCEDURE — 83001 ASSAY OF GONADOTROPIN (FSH): CPT

## 2020-05-22 LAB — VITAMIN D 25-HYDROXY: 69 NG/ML (ref 30–100)

## 2020-05-23 LAB — DHEAS (DHEA SULFATE): 81 UG/DL (ref 26–200)

## 2020-05-24 LAB
PROGESTERONE LEVEL: 0.13 NG/ML
SEX HORMONE BINDING GLOBULIN: 113 NMOL/L (ref 30–135)
TESTOSTERONE FREE-NONMALE: <1 PG/ML (ref 0.6–3.8)
TESTOSTERONE TOTAL: 9 NG/DL (ref 20–70)

## 2020-06-02 ENCOUNTER — HOSPITAL ENCOUNTER (OUTPATIENT)
Age: 55
Discharge: HOME | End: 2020-06-04
Payer: COMMERCIAL

## 2020-06-02 ENCOUNTER — HOSPITAL ENCOUNTER (OUTPATIENT)
Dept: GENERAL RADIOLOGY | Age: 55
Discharge: HOME | End: 2020-06-04
Payer: COMMERCIAL

## 2020-06-02 PROCEDURE — 73502 X-RAY EXAM HIP UNI 2-3 VIEWS: CPT

## 2020-06-15 ENCOUNTER — HOSPITAL ENCOUNTER (OUTPATIENT)
Age: 55
Discharge: HOME | End: 2020-06-17
Payer: COMMERCIAL

## 2020-06-15 PROCEDURE — 88175 CYTOPATH C/V AUTO FLUID REDO: CPT

## 2020-06-15 PROCEDURE — 87624 HPV HI-RISK TYP POOLED RSLT: CPT

## 2020-07-04 LAB
HPV SAMPLE: NORMAL
HPV TYPE 16: NOT DETECTED
HPV TYPE 18: NOT DETECTED
HPV, HIGH RISK OTHER: NOT DETECTED
INTERPRETATION: NORMAL
SOURCE: NORMAL

## 2020-07-16 ENCOUNTER — HOSPITAL ENCOUNTER (OUTPATIENT)
Age: 55
Discharge: HOME | End: 2020-07-18
Payer: COMMERCIAL

## 2020-07-16 LAB
CORTISOL TOTAL: 9.15 MCG/DL (ref 2.68–18.4)
ESTRADIOL LEVEL: 46 PG/ML
FOLLICLE STIMULATING HORMONE: 32.6 MIU/ML
T3 FREE: 2.7 PG/ML (ref 2–4.4)
T4 FREE: 1.1 NG/DL (ref 0.93–1.7)
TSH SERPL DL<=0.05 MIU/L-ACNC: 2.46 UIU/ML (ref 0.27–4.2)
VITAMIN D 25-HYDROXY: 96 NG/ML (ref 30–100)

## 2020-07-16 PROCEDURE — 82670 ASSAY OF TOTAL ESTRADIOL: CPT

## 2020-07-16 PROCEDURE — 82306 VITAMIN D 25 HYDROXY: CPT

## 2020-07-16 PROCEDURE — 83001 ASSAY OF GONADOTROPIN (FSH): CPT

## 2020-07-16 PROCEDURE — 82533 TOTAL CORTISOL: CPT

## 2020-07-16 PROCEDURE — 84439 ASSAY OF FREE THYROXINE: CPT

## 2020-07-16 PROCEDURE — 84403 ASSAY OF TOTAL TESTOSTERONE: CPT

## 2020-07-16 PROCEDURE — 84144 ASSAY OF PROGESTERONE: CPT

## 2020-07-16 PROCEDURE — 84140 ASSAY OF PREGNENOLONE: CPT

## 2020-07-16 PROCEDURE — 82627 DEHYDROEPIANDROSTERONE: CPT

## 2020-07-16 PROCEDURE — 84270 ASSAY OF SEX HORMONE GLOBUL: CPT

## 2020-07-16 PROCEDURE — 84481 FREE ASSAY (FT-3): CPT

## 2020-07-16 PROCEDURE — 84443 ASSAY THYROID STIM HORMONE: CPT

## 2020-07-18 LAB — DHEAS (DHEA SULFATE): 99 UG/DL (ref 26–200)

## 2020-07-20 LAB
PROGESTERONE LEVEL: 1.31 NG/ML
SEX HORMONE BINDING GLOBULIN: 70 NMOL/L (ref 30–135)
TESTOSTERONE FREE-NONMALE: <1 PG/ML (ref 0.6–3.8)
TESTOSTERONE TOTAL: 5 NG/DL (ref 20–70)

## 2020-07-22 LAB — PREGNENOLONE: 55 NG/DL (ref 15–132)

## 2020-09-14 ENCOUNTER — OFFICE VISIT (OUTPATIENT)
Dept: SURGERY | Age: 55
End: 2020-09-14
Payer: COMMERCIAL

## 2020-09-14 ENCOUNTER — TELEPHONE (OUTPATIENT)
Dept: SURGERY | Age: 55
End: 2020-09-14

## 2020-09-14 VITALS
HEART RATE: 53 BPM | WEIGHT: 181 LBS | DIASTOLIC BLOOD PRESSURE: 62 MMHG | TEMPERATURE: 97.2 F | HEIGHT: 66 IN | SYSTOLIC BLOOD PRESSURE: 100 MMHG | OXYGEN SATURATION: 98 % | BODY MASS INDEX: 29.09 KG/M2 | RESPIRATION RATE: 18 BRPM

## 2020-09-14 PROCEDURE — 1036F TOBACCO NON-USER: CPT | Performed by: SURGERY

## 2020-09-14 PROCEDURE — G8427 DOCREV CUR MEDS BY ELIG CLIN: HCPCS | Performed by: SURGERY

## 2020-09-14 PROCEDURE — 3017F COLORECTAL CA SCREEN DOC REV: CPT | Performed by: SURGERY

## 2020-09-14 PROCEDURE — G8417 CALC BMI ABV UP PARAM F/U: HCPCS | Performed by: SURGERY

## 2020-09-14 PROCEDURE — 99213 OFFICE O/P EST LOW 20 MIN: CPT | Performed by: SURGERY

## 2020-09-14 NOTE — PROGRESS NOTES
Progress Note - Follow up    Patient's Name/Date of Birth: Soni Adams / 1965    Date: 9/14/2020    PCP: Elda Howard MD    Referring Physician:   Keith Posada MD  [phone]    Chief Complaint   Patient presents with   Kirsten Monica    Nausea       HPI:    The patient said she is feeling a hard area in the middle of her abdomen. She has had chronic constipation. She said she is having trouble losing weight. She said she isn't hungry because he stomach always feels like she is full. She is seeing a  and a nutritionist and they told her she needs to eat more. She said she feels like she is belching up food from the day before. She said she gest the sensation that food is getting stuck in her esophagus. She said she has lower esophageal pain like she took too big of a bite. The patient has no family history of pancreatic or ovarian cancer. She said she has more incontinence than before. She said she has gained 25 pounds in the last year. She has nausea without vomiting. Decreased appetite and early satiety. The patient is taking Linzess and she said it is working for her. She said she still feels like she has something in there. Patient's medications, allergies, past medical, surgical, social and family histories were reviewed and updated as appropriate. Review of Systems  Constitutional: negative  Respiratory: negative  Cardiovascular: negative  Gastrointestinal: as in hpi  Genitourinary:negative  Integument/breast: negative    Physical Exam:  /62   Pulse 53   Temp 97.2 °F (36.2 °C) (Infrared)   Resp 18   Ht 5' 6\" (1.676 m)   Wt 181 lb (82.1 kg)   SpO2 98%   BMI 29.21 kg/m²   General appearance: alert, cooperative and in no acute distress. Lungs: clear to auscultation bilaterally  Heart: regular rate and rhythm  Abdomen: soft, nontender, nondistended  Musculoskeletal: symmetrical without clubbing cyanosis or edema. Skin: normal     Impression/Plan:  63 y.o. year old female with chronic constipation, nausea, decreased appetite, early satiety, dysphagia, odynophagia, epigastric pain    EGD possible biopsy possible polypectomy  I explained the risks, benefits, alternatives, and potential complications associated with the above procedure to be performed and transfusions when applicable with the patient/responsible person prior to the procedure. All of the patient's questions were answered. The patient understands and agrees to surgery.      CT abd pelvis with PO and IV contrast    Gynecology evaluation    Electronically by Judy Benítez MD, General Surgery  on 9/14/2020 at 4:03 PM      Send copy of H&P to PCP, Rocco Lester MD and referring physician, Lizandro Gutierrez MD      9/14/2020

## 2020-09-23 ENCOUNTER — TELEPHONE (OUTPATIENT)
Dept: SURGERY | Age: 55
End: 2020-09-23

## 2020-09-23 NOTE — TELEPHONE ENCOUNTER
Prior Authorization Form:      DEMOGRAPHICS:                     Patient Name:  Alejandro Lawrence  Patient :  1965            Insurance:  Payor: Mendel Mayhew / Plan: Lyndsey / Product Type: *No Product type* /   Insurance ID Number:    Payor/Plan Subscr  Sex Relation Sub.  Ins. ID Effective Group Num   1. [address]* 1965 Female  [id number] 1/1/15 [id number]                                   [id number]         DIAGNOSIS & PROCEDURE:                       Procedure/Operation: EGD           CPT Code: 91938    Diagnosis:  ABD PAIN  BLOATING    ICD10 Code:  R10.9   R 14.0    Location:  [place]    Surgeon:  Joseph Arevalo INFORMATION:                          Date:   10-7-2020   Time:  11:30             Anesthesia:  MAC/TIVA                                                       Status:  Outpatient        Special Comments:         Electronically signed by Lamont Newby MA on 2020 at 10:41 AM

## 2020-09-27 ENCOUNTER — HOSPITAL ENCOUNTER (OUTPATIENT)
Dept: CT IMAGING | Age: 55
Discharge: HOME | End: 2020-09-29
Payer: COMMERCIAL

## 2020-09-27 PROCEDURE — 6360000004 HC RX CONTRAST MEDICATION: Performed by: RADIOLOGY

## 2020-09-27 PROCEDURE — 74177 CT ABD & PELVIS W/CONTRAST: CPT

## 2020-09-27 RX ADMIN — IOHEXOL 50 ML: 240 INJECTION, SOLUTION INTRATHECAL; INTRAVASCULAR; INTRAVENOUS; ORAL at 12:05

## 2020-09-27 RX ADMIN — IOPAMIDOL 110 ML: 755 INJECTION, SOLUTION INTRAVENOUS at 12:05

## 2020-09-30 ENCOUNTER — TELEPHONE (OUTPATIENT)
Dept: SURGERY | Age: 55
End: 2020-09-30

## 2020-10-07 ENCOUNTER — TELEPHONE (OUTPATIENT)
Dept: SURGERY | Age: 55
End: 2020-10-07

## 2020-10-07 NOTE — TELEPHONE ENCOUNTER
Silvia Matias called and canceled her egd scheduled for 10-7-2020 due to ride issue will call back to reschdule

## 2020-10-08 ENCOUNTER — HOSPITAL ENCOUNTER (OUTPATIENT)
Age: 55
Discharge: HOME | End: 2020-10-10
Payer: COMMERCIAL

## 2020-10-08 LAB
ANION GAP SERPL CALCULATED.3IONS-SCNC: 15 MMOL/L (ref 7–16)
CHLORIDE BLD-SCNC: 102 MMOL/L (ref 98–107)
CO2: 22 MMOL/L (ref 22–29)
CORTISOL TOTAL: 9.94 MCG/DL (ref 2.68–18.4)
ESTRADIOL LEVEL: 21.1 PG/ML
HBA1C MFR BLD: 5 % (ref 4–5.6)
POTASSIUM SERPL-SCNC: 4.7 MMOL/L (ref 3.5–5)
SODIUM BLD-SCNC: 139 MMOL/L (ref 132–146)

## 2020-10-08 PROCEDURE — 83036 HEMOGLOBIN GLYCOSYLATED A1C: CPT

## 2020-10-08 PROCEDURE — 80051 ELECTROLYTE PANEL: CPT

## 2020-10-08 PROCEDURE — 84403 ASSAY OF TOTAL TESTOSTERONE: CPT

## 2020-10-08 PROCEDURE — 84144 ASSAY OF PROGESTERONE: CPT

## 2020-10-08 PROCEDURE — 82670 ASSAY OF TOTAL ESTRADIOL: CPT

## 2020-10-08 PROCEDURE — 82533 TOTAL CORTISOL: CPT

## 2020-10-08 PROCEDURE — 84270 ASSAY OF SEX HORMONE GLOBUL: CPT

## 2020-10-08 PROCEDURE — 82627 DEHYDROEPIANDROSTERONE: CPT

## 2020-10-11 LAB
DHEAS (DHEA SULFATE): 52 UG/DL (ref 26–200)
PROGESTERONE LEVEL: 1.84 NG/ML
SEX HORMONE BINDING GLOBULIN: 56 NMOL/L (ref 30–135)
TESTOSTERONE FREE-NONMALE: 8.7 PG/ML (ref 0.6–3.8)
TESTOSTERONE TOTAL: 68 NG/DL (ref 20–70)

## 2020-10-12 ENCOUNTER — OFFICE VISIT (OUTPATIENT)
Dept: SURGERY | Age: 55
End: 2020-10-12
Payer: COMMERCIAL

## 2020-10-12 VITALS
DIASTOLIC BLOOD PRESSURE: 63 MMHG | BODY MASS INDEX: 28.93 KG/M2 | WEIGHT: 180 LBS | HEIGHT: 66 IN | HEART RATE: 53 BPM | TEMPERATURE: 97.7 F | RESPIRATION RATE: 20 BRPM | OXYGEN SATURATION: 98 % | SYSTOLIC BLOOD PRESSURE: 97 MMHG

## 2020-10-12 PROCEDURE — 99214 OFFICE O/P EST MOD 30 MIN: CPT | Performed by: SURGERY

## 2020-10-12 PROCEDURE — G8484 FLU IMMUNIZE NO ADMIN: HCPCS | Performed by: SURGERY

## 2020-10-12 PROCEDURE — 1036F TOBACCO NON-USER: CPT | Performed by: SURGERY

## 2020-10-12 PROCEDURE — 3017F COLORECTAL CA SCREEN DOC REV: CPT | Performed by: SURGERY

## 2020-10-12 PROCEDURE — G8417 CALC BMI ABV UP PARAM F/U: HCPCS | Performed by: SURGERY

## 2020-10-12 PROCEDURE — G8428 CUR MEDS NOT DOCUMENT: HCPCS | Performed by: SURGERY

## 2020-10-12 NOTE — PROGRESS NOTES
Progress Note - Follow up    Patient's Name/Date of Birth: Keren Ignacio / 1965    Date: 10/12/2020    PCP: Bar Delacruz MD    Referring Physician:   Renny Vyas MD  [phone]    Chief Complaint   Patient presents with    Results     CT follow up- ethan is not working        HPI:    The patient continues to have constipation. She said the Yany Dsouza is not doing as much. She said it worked for a while. She saw her gynecologist and everything was normal. She continues to feel full and nauseous and bloated with dysphagia. Patient's medications, allergies, past medical, surgical, social and family histories were reviewed and updated as appropriate. Review of Systems  Constitutional: negative  Respiratory: negative  Cardiovascular: negative  Gastrointestinal: as in hpi  Genitourinary:negative  Integument/breast: negative    Physical Exam:  BP 97/63   Pulse 53   Temp 97.7 °F (36.5 °C) (Infrared)   Resp 20   Ht 5' 6\" (1.676 m)   Wt 180 lb (81.6 kg)   SpO2 98%   BMI 29.05 kg/m²   General appearance: alert, cooperative and in no acute distress. Lungs: clear to auscultation bilaterally  Heart: regular rate and rhythm  Abdomen: soft, nontender, nondistended  Musculoskeletal: symmetrical without clubbing cyanosis or edema. Skin: normal    Impression/Plan:  63y.o. year old female with chronic constipation, dysphagia    Discussed bowel regimen with the patient. miralax daily - it worked for her colonoscopy  Linzess as needed    EGD possible biopsy possible polypectomy  I explained the risks, benefits, alternatives, and potential complications associated with the above procedure to be performed and transfusions when applicable with the patient/responsible person prior to the procedure. All of the patient's questions were answered. The patient understands and agrees to surgery.      Electronically by Amadou Patel MD, General Surgery  on 10/12/2020 at 4:09 PM      Send copy of H&P to PCP, Mamie Riggs MD and referring physician, Debbie Coello MD      10/12/2020

## 2020-10-13 ENCOUNTER — TELEPHONE (OUTPATIENT)
Dept: SURGERY | Age: 55
End: 2020-10-13

## 2020-10-13 NOTE — TELEPHONE ENCOUNTER
Prior Authorization Form:      DEMOGRAPHICS:                     Patient Name:  Afsaneh Hammer  Patient :  1965            Insurance:  Payor: Regla Hurtado / [facility] / Product Type: *No Product type* /   Insurance ID Number:    Payor/Plan Subscr  Sex Relation Sub.  Ins. ID Effective Group Num   1. [address] 1965 Female  [id number]                                   [address]         DIAGNOSIS & PROCEDURE:                       Procedure/Operation:  EGD           CPT Code: 00765    Diagnosis:  DYSPHAGIA    ICD10 Code: R13.10    Location:  [id number]    Surgeon:  Winston Morgan INFORMATION:                          Date: 2020    Time: 8:30              Anesthesia:  MAC/TIVA                                                       Status:  Outpatient        Special Comments:         Electronically signed by Yane Osorio MA on 10/13/2020 at 1:41 PM

## 2020-11-30 ENCOUNTER — TELEPHONE (OUTPATIENT)
Dept: SURGERY | Age: 55
End: 2020-11-30

## 2020-11-30 NOTE — TELEPHONE ENCOUNTER
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

## 2021-01-06 ENCOUNTER — HOSPITAL ENCOUNTER (EMERGENCY)
Age: 56
Discharge: HOME | End: 2021-01-06
Payer: COMMERCIAL

## 2021-01-06 ENCOUNTER — APPOINTMENT (OUTPATIENT)
Dept: GENERAL RADIOLOGY | Age: 56
End: 2021-01-06
Payer: COMMERCIAL

## 2021-01-06 VITALS
OXYGEN SATURATION: 96 % | TEMPERATURE: 97.4 F | HEIGHT: 66 IN | BODY MASS INDEX: 28.12 KG/M2 | WEIGHT: 175 LBS | SYSTOLIC BLOOD PRESSURE: 108 MMHG | DIASTOLIC BLOOD PRESSURE: 70 MMHG | HEART RATE: 68 BPM | RESPIRATION RATE: 16 BRPM

## 2021-01-06 DIAGNOSIS — H81.12 BENIGN PAROXYSMAL POSITIONAL VERTIGO OF LEFT EAR: ICD-10-CM

## 2021-01-06 DIAGNOSIS — R05.9 COUGH: Primary | ICD-10-CM

## 2021-01-06 DIAGNOSIS — Z20.822 SUSPECTED COVID-19 VIRUS INFECTION: ICD-10-CM

## 2021-01-06 LAB
ALBUMIN SERPL-MCNC: 4.3 G/DL (ref 3.5–5.2)
ALP BLD-CCNC: 72 U/L (ref 35–104)
ALT SERPL-CCNC: 18 U/L (ref 0–32)
ANION GAP SERPL CALCULATED.3IONS-SCNC: 9 MMOL/L (ref 7–16)
AST SERPL-CCNC: 28 U/L (ref 0–31)
BILIRUB SERPL-MCNC: 0.4 MG/DL (ref 0–1.2)
BUN BLDV-MCNC: 14 MG/DL (ref 6–20)
CALCIUM SERPL-MCNC: 10.5 MG/DL (ref 8.6–10.2)
CHLORIDE BLD-SCNC: 101 MMOL/L (ref 98–107)
CO2: 26 MMOL/L (ref 22–29)
CREAT SERPL-MCNC: 0.8 MG/DL (ref 0.5–1)
D DIMER: <200 NG/ML DDU
EKG ATRIAL RATE: 58 BPM
EKG P AXIS: 64 DEGREES
EKG P-R INTERVAL: 94 MS
EKG Q-T INTERVAL: 440 MS
EKG QRS DURATION: 82 MS
EKG QTC CALCULATION (BAZETT): 431 MS
EKG R AXIS: 53 DEGREES
EKG T AXIS: 59 DEGREES
EKG VENTRICULAR RATE: 58 BPM
GFR AFRICAN AMERICAN: >60
GFR NON-AFRICAN AMERICAN: >60 ML/MIN/1.73
GLUCOSE BLD-MCNC: 102 MG/DL (ref 74–99)
POTASSIUM REFLEX MAGNESIUM: 4.7 MMOL/L (ref 3.5–5)
PRO-BNP: 111 PG/ML (ref 0–125)
SODIUM BLD-SCNC: 136 MMOL/L (ref 132–146)
TOTAL PROTEIN: 8 G/DL (ref 6.4–8.3)
TROPONIN: <0.01 NG/ML (ref 0–0.03)

## 2021-01-06 PROCEDURE — 71045 X-RAY EXAM CHEST 1 VIEW: CPT

## 2021-01-06 PROCEDURE — 80053 COMPREHEN METABOLIC PANEL: CPT

## 2021-01-06 PROCEDURE — 84484 ASSAY OF TROPONIN QUANT: CPT

## 2021-01-06 PROCEDURE — 99283 EMERGENCY DEPT VISIT LOW MDM: CPT

## 2021-01-06 PROCEDURE — 85378 FIBRIN DEGRADE SEMIQUANT: CPT

## 2021-01-06 PROCEDURE — 83880 ASSAY OF NATRIURETIC PEPTIDE: CPT

## 2021-01-06 PROCEDURE — 2580000003 HC RX 258: Performed by: NURSE PRACTITIONER

## 2021-01-06 PROCEDURE — 93005 ELECTROCARDIOGRAM TRACING: CPT | Performed by: NURSE PRACTITIONER

## 2021-01-06 PROCEDURE — 36415 COLL VENOUS BLD VENIPUNCTURE: CPT

## 2021-01-06 PROCEDURE — U0003 INFECTIOUS AGENT DETECTION BY NUCLEIC ACID (DNA OR RNA); SEVERE ACUTE RESPIRATORY SYNDROME CORONAVIRUS 2 (SARS-COV-2) (CORONAVIRUS DISEASE [COVID-19]), AMPLIFIED PROBE TECHNIQUE, MAKING USE OF HIGH THROUGHPUT TECHNOLOGIES AS DESCRIBED BY CMS-2020-01-R: HCPCS

## 2021-01-06 RX ORDER — CEFDINIR 300 MG/1
300 CAPSULE ORAL 2 TIMES DAILY
Qty: 14 CAPSULE | Refills: 0 | Status: SHIPPED | OUTPATIENT
Start: 2021-01-06 | End: 2021-01-13

## 2021-01-06 RX ORDER — 0.9 % SODIUM CHLORIDE 0.9 %
1000 INTRAVENOUS SOLUTION INTRAVENOUS ONCE
Status: COMPLETED | OUTPATIENT
Start: 2021-01-06 | End: 2021-01-06

## 2021-01-06 RX ADMIN — SODIUM CHLORIDE 1000 ML: 9 INJECTION, SOLUTION INTRAVENOUS at 14:08

## 2021-01-06 ASSESSMENT — PAIN DESCRIPTION - PROGRESSION: CLINICAL_PROGRESSION: NOT CHANGED

## 2021-01-06 ASSESSMENT — PAIN DESCRIPTION - ONSET: ONSET: ON-GOING

## 2021-01-06 NOTE — LETTER
[address] [facility] Emergency Department  [id number] [facility] [address]  Phone: [phone]             January 6, 2021    Patient: Jessica Iqbal   YOB: 1965   Date of Visit: 1/6/2021       To Whom It May Concern:    Walt Navarro was seen and treated in our emergency department on 1/6/2021. Fidel Apodaca has been tested for Covid 19. You are to self isolate until test results.       Sincerely,             Signature:__________________________________

## 2021-01-06 NOTE — ED PROVIDER NOTES
[address] [facility]  Department of Emergency Medicine   ED  Encounter Note  Admit Date/RoomTime: 2021 12:52 PM  ED Room:     NAME: Fabian Granados  : 1965  MRN: [id number]     Chief Complaint:  Dizziness (some dizziness. Lungs burn and feels full. Hot and cold. Tested negative for covid on the . Some chest heaviness and pressure. )    History of Present Illness          Fabian Granados is a 63 y.o. old female who presents to the emergency department for complaint of cough, dizziness, and burning and full sensation in her lungs. Patient reports concern for COVID-19. States that she has been having hot and cold spells. States that her cough has been primarily dry. States that she did have a COVID-19 test completed at [facility] 14 days ago and was found to be negative. She reports any concern for COVID-19, states that her son is ill as well and he has been tested but is pending results. Denies any change in smell or taste. Denies diarrhea. Denies any documented fever. Reports that a couple weeks ago she was experiencing dizziness which has improved over the past week. Denies shortness of breath. Denies hemoptysis. Reports generalized body aches. States that she originally started with a headache 2 weeks ago but states that it to has subsided. Denies any injury or trauma. Denies any blood thinner use. Denies numbness, tingling, difficulty speaking, extremity weakness, or any other complaints at this time. .  ROS   Pertinent positives and negatives are stated within HPI, all other systems reviewed and are negative. Past Medical History:  has a past medical history of Abdominal cramping, Anxiety, Asthma, Constipation, Delivery normal, Depression, Left arm pain, and SOB (shortness of breath). Surgical History:  has a past surgical history that includes cyst removal; Breast surgery (Right, ); Tubal ligation ();  Colonoscopy 21   Comprehensive Metabolic Panel w/ Reflex to MG   Result Value Ref Range    Sodium 136 132 - 146 mmol/L    Potassium reflex Magnesium 4.7 3.5 - 5.0 mmol/L    Chloride 101 98 - 107 mmol/L    CO2 26 22 - 29 mmol/L    Anion Gap 9 7 - 16 mmol/L    Glucose 102 (H) 74 - 99 mg/dL    BUN 14 6 - 20 mg/dL    CREATININE 0.8 0.5 - 1.0 mg/dL    GFR Non-African American >60 >=60 mL/min/1.73    GFR African American >60     Calcium 10.5 (H) 8.6 - 10.2 mg/dL    Total Protein 8.0 6.4 - 8.3 g/dL    Alb 4.3 3.5 - 5.2 g/dL    Total Bilirubin 0.4 0.0 - 1.2 mg/dL    Alkaline Phosphatase 72 35 - 104 U/L    ALT 18 0 - 32 U/L    AST 28 0 - 31 U/L   Troponin   Result Value Ref Range    Troponin <0.01 0.00 - 0.03 ng/mL   Brain Natriuretic Peptide   Result Value Ref Range    Pro- 0 - 125 pg/mL   D-Dimer, Quantitative   Result Value Ref Range    D-Dimer, Quant <200 ng/mL DDU   Covid-19 Ambulatory   Result Value Ref Range    Source NP swab    EKG 12 Lead   Result Value Ref Range    Ventricular Rate 58 BPM    Atrial Rate 58 BPM    P-R Interval 94 ms    QRS Duration 82 ms    Q-T Interval 440 ms    QTc Calculation (Bazett) 431 ms    P Axis 64 degrees    R Axis 53 degrees    T Axis 59 degrees       Imaging: All Radiology results interpreted by Radiologist unless otherwise noted. XR CHEST PORTABLE   Final Result   No acute process. EKG #1:  Interpreted by emergency department physician unless otherwise noted. Time:  1307    Rate: 58 bpm  Rhythm: Sinus Bradycardia. Interpretation: Sinus bradycardia.  No ischemic findings    HEART Score For Major Cardiac Events  (Max Score 10 Points)  HISTORY       [x]   Slightly Suspicious  0       []   Moderately Suspicious  +1       []   Highly Suspicious  +2    EK point: No ST deviation but LBBB, LVH repolarization changes (ex:digoxin);               2 points: ST deviation not due to LBBB, LVH or digoxin         [x]   Normal  0       []   Nonspecific Repolarization Disturbance  +1 []   Significant ST Depression  +2    AGE       []   <45  0       [x]   45-64  +1       []    >65  +2    RISK FACTORS:  1. HTN    2. Hypercholesterolemia    3. DM     4. Cigarette smoking (current or cessation < 3 mos)    5. Positive family history  (parent or sibling with CVD before age 72). 6. Obesity (BMI >30kg/m2)         []   No Risk factors Known  0       [x]   1-2 Risk Factors  +1       []   >3 Risk Factors or History of Atherosclerotic Disease  +2      INITIAL TROPONIN       [x]   < Normal Limit   0       []   1-3 x Normal Limit   +1       []   >3 x Normal Limit   +2     -----------------------------------------------------------------------------------------------------------------  SCORE TOTAL:  1 POINTS     Low Score          (0-3 Points), risk of MACE of 0.9-1.7% (discuss d/c home with f/u)  Mod Score          (4-6 Points), risk of MACE of 12-16.6% (discuss admission for further testing)  High Score         (7-10 Points), risk of MACE of 50-65% (Admit ALL as they are candidates for early invasive measures)  ED Course / Medical Decision Making     Medications   0.9 % sodium chloride bolus (0 mLs Intravenous Stopped 1/6/21 6149)        Consultations:             None    Procedures:   none    MDM:  Tavo [place] is a 25-year-old female who presented to the emergency department for complaint of insert for COVID-19. She reported that she was tested a couple of weeks ago and found to be negative. She reports that she has continued to have generalized body aches and chills. She reported initial complaint of dizziness and headache with onset of symptoms 2 weeks ago, which she states has significantly subsided. No injury or trauma. 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 Denies shortness of breath or chest pain but did state that she has a sensation in her chest which feels like it is stemming from her lungs. Troponin less than 0.01. EKG sinus rhythm with no ischemic findings. Heart score 1.   CMP Lyder, APRN - CNP   DD: 1/6/21  **This report was transcribed using voice recognition software. Every effort was made to ensure accuracy; however, inadvertent computerized transcription errors may be present.   END OF PROVIDER NOTE      MERRICK Novoa CNP  01/06/21 [id number]

## 2021-01-07 ENCOUNTER — CARE COORDINATION (OUTPATIENT)
Dept: CARE COORDINATION | Age: 56
End: 2021-01-07

## 2021-01-07 ENCOUNTER — TELEPHONE (OUTPATIENT)
Dept: FAMILY MEDICINE CLINIC | Age: 56
End: 2021-01-07

## 2021-01-07 LAB — SARS-COV-2, PCR: NOT DETECTED

## 2021-01-07 NOTE — CARE COORDINATION
Patient contacted regarding recent discharge and COVID-19 risk. Discussed COVID-19 related testing which was available at this time. Test results were negative. Patient informed of results, if available? Yes. Pt was aware of results prior to call, as she was able to access results through her [employer] account. Care Transition Nurse/ Ambulatory Care Manager contacted the patient by telephone to perform post discharge assessment. Verified name and  with patient as identifiers. Patient has following risk factors of: asthma. CTN/ACM reviewed discharge instructions, medical action plan and red flags related to discharge diagnosis. Reviewed and educated them on any new and changed medications related to discharge diagnosis. Advised obtaining a 90-day supply of all daily and as-needed medications. Education provided regarding infection prevention, and signs and symptoms of COVID-19 and when to seek medical attention with patient who verbalized understanding. Discussed exposure protocols and quarantine from [address] you at higher risk for severe illness  and given an opportunity for questions and concerns. The patient agrees to contact the COVID-19 hotline [phone] or PCP office for questions related to their healthcare. CTN/ACM provided contact information for future reference. From CDC: Are you at higher risk for severe illness?  Wash your hands often.  Avoid close contact (6 feet, which is about two arm lengths) with people who are sick.  Put distance between yourself and other people if COVID-19 is spreading in your community.  Clean and disinfect frequently touched surfaces.  Avoid all cruise travel and non-essential air travel.  Call your healthcare professional if you have concerns about COVID-19 and your underlying condition or if you are sick.     For more information on steps you can take to protect yourself, see CDC's How to Protect Yourself    Pt declined any further f/u calls stating it was not necessary. Will resolve COVID-19 monitoring episode. Pt returned RN's call regarding her ED visit on 1/6/21 for cough, dizziness, and chest feeling heavy/burning. Pt states that she does feel somewhat better today, but states that cough is still present and that her L ear feels \"full. \" Pt states that she has been running intermittent fevers, with last check at 99. Does have chills. No sob. RN offered to assist pt in scheduling a f/u with her pcp, however, she declined stating she can contact the office herself. RN did provide the pt with local flu clinic locations and hours of operation if needed. Pt declined Loop enrollment. 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

## 2021-01-07 NOTE — TELEPHONE ENCOUNTER
Pt was seen at [address] yesterday tested negative for Covid , pt states cough is getting worse and now has started with ear pain today. Would like to know if could send in antibiotic ear drops and Tessalon Perls to pharmacy.   Please advise

## 2021-01-08 ENCOUNTER — TELEPHONE (OUTPATIENT)
Dept: FAMILY MEDICINE CLINIC | Age: 56
End: 2021-01-08

## 2021-01-08 ENCOUNTER — OFFICE VISIT (OUTPATIENT)
Dept: FAMILY MEDICINE CLINIC | Age: 56
End: 2021-01-08
Payer: COMMERCIAL

## 2021-01-08 VITALS
DIASTOLIC BLOOD PRESSURE: 68 MMHG | HEART RATE: 64 BPM | SYSTOLIC BLOOD PRESSURE: 102 MMHG | HEIGHT: 66 IN | OXYGEN SATURATION: 97 % | TEMPERATURE: 97.5 F | BODY MASS INDEX: 28.12 KG/M2 | RESPIRATION RATE: 18 BRPM | WEIGHT: 175 LBS

## 2021-01-08 DIAGNOSIS — H92.02 EAR PAIN, LEFT: Primary | ICD-10-CM

## 2021-01-08 DIAGNOSIS — J06.9 VIRAL URI: ICD-10-CM

## 2021-01-08 PROCEDURE — G8427 DOCREV CUR MEDS BY ELIG CLIN: HCPCS | Performed by: INTERNAL MEDICINE

## 2021-01-08 PROCEDURE — 99213 OFFICE O/P EST LOW 20 MIN: CPT | Performed by: INTERNAL MEDICINE

## 2021-01-08 PROCEDURE — G8484 FLU IMMUNIZE NO ADMIN: HCPCS | Performed by: INTERNAL MEDICINE

## 2021-01-08 PROCEDURE — 1036F TOBACCO NON-USER: CPT | Performed by: INTERNAL MEDICINE

## 2021-01-08 PROCEDURE — 3017F COLORECTAL CA SCREEN DOC REV: CPT | Performed by: INTERNAL MEDICINE

## 2021-01-08 PROCEDURE — G8417 CALC BMI ABV UP PARAM F/U: HCPCS | Performed by: INTERNAL MEDICINE

## 2021-01-08 RX ORDER — AZITHROMYCIN 500 MG/1
500 TABLET, FILM COATED ORAL DAILY
Qty: 1 PACKET | Refills: 0 | Status: SHIPPED
Start: 2021-01-08 | End: 2021-01-08 | Stop reason: SINTOL

## 2021-01-08 RX ORDER — BENZONATATE 100 MG/1
100 CAPSULE ORAL 2 TIMES DAILY PRN
Qty: 20 CAPSULE | Refills: 0 | Status: SHIPPED | OUTPATIENT
Start: 2021-01-08 | End: 2021-01-15

## 2021-01-08 ASSESSMENT — PATIENT HEALTH QUESTIONNAIRE - PHQ9
SUM OF ALL RESPONSES TO PHQ9 QUESTIONS 1 & 2: 0
SUM OF ALL RESPONSES TO PHQ QUESTIONS 1-9: 0
1. LITTLE INTEREST OR PLEASURE IN DOING THINGS: 0

## 2021-01-08 NOTE — PROGRESS NOTES
Patient:  Emily Palencia  MRN: [id number]  Date of Service: 2021   1965      CHIEF COMPLAINT:    Chief Complaint   Patient presents with    Headache     SINCE 20 / [facility] and did not get rx yet / took two Covid tests (neg)     Otalgia    Cough     Productive in the am    Fever    Chills       History Obtained From:  patient    HISTORY OF PRESENT ILLNESS:   The patient is a 63 y.o. female  Is here with a cough for last one week. She has left ear pain for few days. She did use a q tip last evening in her left ear and saw some black colour clotted blood very small in amount. Right ear has no pain. No neck pain. She states she tested negative for Covid x2. Cough is accompanied with a yellowish phlegm. Her breathing is fine. She has no night sweats. Past medical, surgical and family history reviewed and updated. Medications, allergies, and social history reviewed and updated. ROS:  Negative except for cough and left ear pain. Physical Exam:      General appearance: alert, appears stated age and cooperative  Vitals:   Vitals:    21 1106   BP: 102/68   Site: Left Upper Arm   Position: Sitting   Cuff Size: Medium Adult   Pulse: 64   Resp: 18   Temp: 97.5 °F (36.4 °C)   TempSrc: Temporal   SpO2: 97%   Weight: 175 lb (79.4 kg)   Height: 5' 6\" (1.676 m)     Skin: Skin color, texture, turgor normal. No rashes or lesions. HEENT: Head: Normocephalic, no lesions, without obvious abnormality. Head: Normal, normocephalic, atraumatic. Eye: Normal external eye, conjunctiva, lids cornea, ROSCOE. Ears: right ear is normal on exam.Left ear with no mastoid tenderness. Exam of left ear with inflammation of Tympanic membrane,some redness and trace blood noted. Nose: Normal external nose, mucus membranes and septum. Neck / Thyroid: Supple, no masses, nodes, nodules or enlargement. Neck: no adenopathy, no carotid bruit, no JVD, supple, symmetrical, trachea midline and thyroid not enlarged, symmetric, no tenderness/mass/nodules  Lungs: clear to auscultation bilaterally  Heart: regular rate and rhythm, S1, S2 normal, no murmur, click, rub or gallop  Abdomen: soft, non-tender; bowel sounds normal; no masses,  no organomegaly  Extremities: extremities normal, atraumatic, no cyanosis or edema  Neurologic: Mental status: Alert, oriented, thought content appropriate    Labs:  CBC:   Lab Results   Component Value Date    WBC 5.0 03/15/2019    RBC 4.74 03/15/2019    HGB 15.0 03/15/2019    HCT 45.4 03/15/2019    MCV 95.8 03/15/2019    MCH 31.6 03/15/2019    MCHC 33.0 03/15/2019    RDW 12.4 03/15/2019     03/15/2019    MPV 10.3 03/15/2019     WBC:    Lab Results   Component Value Date    WBC 5.0 03/15/2019     Platelets:    Lab Results   Component Value Date     03/15/2019     CMP:    Lab Results   Component Value Date     01/06/2021    K 4.7 01/06/2021     01/06/2021    CO2 26 01/06/2021    BUN 14 01/06/2021    CREATININE 0.8 01/06/2021    GFRAA >60 01/06/2021    LABGLOM >60 01/06/2021    GLUCOSE 102 01/06/2021    PROT 8.0 01/06/2021    LABALBU 4.3 01/06/2021    CALCIUM 10.5 01/06/2021    BILITOT 0.4 01/06/2021    ALKPHOS 72 01/06/2021    AST 28 01/06/2021    ALT 18 01/06/2021     Sodium:    Lab Results   Component Value Date     01/06/2021     Potassium:    Lab Results   Component Value Date    K 4.7 01/06/2021     BUN/Creatinine:    Lab Results   Component Value Date    BUN 14 01/06/2021    CREATININE 0.8 01/06/2021     Hepatic Function Panel:    Lab Results   Component Value Date    ALKPHOS 72 01/06/2021    ALT 18 01/06/2021    AST 28 01/06/2021    PROT 8.0 01/06/2021    BILITOT 0.4 01/06/2021    LABALBU 4.3 01/06/2021     Calcium:    Lab Results   Component Value Date    CALCIUM 10.5 01/06/2021     Ionized Calcium:  No results found for: [employer] Magnesium:  No results found for: MG  Uric Acid:    Lab Results   Component Value Date    LABURIC 4.7 07/06/2016      -----------------------------------------------------------------  EKG: Not indicated today. Assessment and Plan   Codie Quiros was seen today for headache, otalgia, cough, fever and chills. Diagnoses and all orders for this visit:    Ear pain, left  -     [facility] Bobbyn Lucio., DO, Ear, Nose, Throat, [place]  Pt has a prescription for Omnicef from the ER Physician and is going to take it to pharmacy today. Advised not to use and OTC meds. Not to place any OTC drops in ear. Viral URI  -     Discontinue: azithromycin (ZITHROMAX) 500 MG tablet; Take 1 tablet by mouth daily for 3 days  She states she cannot tolerate Zithromax. and gets GI upset. She is going to take the VASQUEZ MONTRELL. Other orders  -     benzonatate (TESSALON) 100 MG capsule; Take 1 capsule by mouth 2 times daily as needed for Cough  Phone call placed to Dr Jeni Cesar office for a urgent ENT appointment. Phone check in a week. Labs reviewed with patient. Medications reviewed with patient. All questions answered. Return to clinic in a month.     Eric Briggs  12:38 PM  1/8/2021

## 2021-01-08 NOTE — TELEPHONE ENCOUNTER
Spoke with patient and she will be in tomorrow at 10.40 am  She tested negative for Covid 19 yesterday

## 2021-01-08 NOTE — TELEPHONE ENCOUNTER
Patient called stating that she cannot take Judithe Jani and is requesting an alternative. .. Also states that you were going to send ABX ear drops. ..   [address]

## 2021-01-12 ENCOUNTER — OFFICE VISIT (OUTPATIENT)
Dept: ENT CLINIC | Age: 56
End: 2021-01-12
Payer: COMMERCIAL

## 2021-01-12 ENCOUNTER — PROCEDURE VISIT (OUTPATIENT)
Dept: AUDIOLOGY | Age: 56
End: 2021-01-12
Payer: COMMERCIAL

## 2021-01-12 VITALS — BODY MASS INDEX: 28.12 KG/M2 | HEIGHT: 66 IN | WEIGHT: 175 LBS | TEMPERATURE: 97.4 F

## 2021-01-12 DIAGNOSIS — J30.89 SEASONAL ALLERGIC RHINITIS DUE TO OTHER ALLERGIC TRIGGER: Primary | ICD-10-CM

## 2021-01-12 DIAGNOSIS — H92.03 EAR PAIN, BILATERAL: ICD-10-CM

## 2021-01-12 DIAGNOSIS — H90.3 SENSORINEURAL HEARING LOSS, BILATERAL: Primary | ICD-10-CM

## 2021-01-12 DIAGNOSIS — H90.3 SENSORINEURAL HEARING LOSS (SNHL) OF BOTH EARS: ICD-10-CM

## 2021-01-12 DIAGNOSIS — H93.13 TINNITUS, BILATERAL: ICD-10-CM

## 2021-01-12 DIAGNOSIS — R42 LIGHTHEADEDNESS: ICD-10-CM

## 2021-01-12 PROCEDURE — G8417 CALC BMI ABV UP PARAM F/U: HCPCS | Performed by: OTOLARYNGOLOGY

## 2021-01-12 PROCEDURE — G8427 DOCREV CUR MEDS BY ELIG CLIN: HCPCS | Performed by: OTOLARYNGOLOGY

## 2021-01-12 PROCEDURE — 3017F COLORECTAL CA SCREEN DOC REV: CPT | Performed by: OTOLARYNGOLOGY

## 2021-01-12 PROCEDURE — 99204 OFFICE O/P NEW MOD 45 MIN: CPT | Performed by: OTOLARYNGOLOGY

## 2021-01-12 PROCEDURE — G8484 FLU IMMUNIZE NO ADMIN: HCPCS | Performed by: OTOLARYNGOLOGY

## 2021-01-12 PROCEDURE — 92557 COMPREHENSIVE HEARING TEST: CPT | Performed by: AUDIOLOGIST

## 2021-01-12 PROCEDURE — 1036F TOBACCO NON-USER: CPT | Performed by: OTOLARYNGOLOGY

## 2021-01-12 PROCEDURE — 92567 TYMPANOMETRY: CPT | Performed by: AUDIOLOGIST

## 2021-01-12 RX ORDER — FLUTICASONE PROPIONATE 50 MCG
2 SPRAY, SUSPENSION (ML) NASAL DAILY
Qty: 1 BOTTLE | Refills: 3 | Status: SHIPPED
Start: 2021-01-12 | End: 2021-11-12

## 2021-01-12 RX ORDER — LINACLOTIDE 290 UG/1
290 CAPSULE, GELATIN COATED ORAL
COMMUNITY
Start: 2020-12-21 | End: 2021-03-03

## 2021-01-12 ASSESSMENT — ENCOUNTER SYMPTOMS
FACIAL SWELLING: 0
EYE PAIN: 0
VOMITING: 0
ABDOMINAL DISTENTION: 0
EYE REDNESS: 0
SORE THROAT: 0
CHEST TIGHTNESS: 0
PHOTOPHOBIA: 0
SINUS PAIN: 0
DIARRHEA: 0
ABDOMINAL PAIN: 0
SINUS PRESSURE: 0
SHORTNESS OF BREATH: 0
NAUSEA: 0

## 2021-01-12 NOTE — PROGRESS NOTES
Subjective:     Patient ID:  James Hart is a 63 y.o. female. HPI:    Patient presents today with ears pain that started 5 days ago. Pain is sharp and intermittent. Saw PCP and thought blood in ears. Denies any drainage or bloody drainage. Used Q tip in ears - looked black. No ear pain now. Usually help with Ibuprofen. Also has history lightheadedness that started about 12 days ago. Feels imbalance and blurry vision. Denies room spinning vertigo. Endorses subjective fever or chills. When checking her temp usually ranges from 96-99F. Had facelifts in Sep 2020.    History of cerumenimpaction: no  History of noiseexposure: no   Type: none  Spinning: no  Hearing loss: yes    Fluctuating: no  Aural pressure: yes  Tinnitus: yes  Otalgia:yes        Past Medical History:   Diagnosis Date    Abdominal cramping     Anxiety     Asthma     controlled per patient    Constipation     Delivery normal     x2    Depression     Left arm pain     had cardiac work-up 2012? 3/16/15 -resolved    SOB (shortness of breath)     resolved     Patient Active Problem List    Diagnosis Date Noted    SOB (shortness of breath)     Left arm pain     Asthma     Anxiety     Depression      Past Surgical History:   Procedure Laterality Date    BREAST SURGERY Right 1980    cyst    COLONOSCOPY  03/2015    Dr. Ashely Broussard, repeat in 8 years    CYST REMOVAL      tailbone, face    FACIAL COSMETIC SURGERY      TUBAL LIGATION  1995     Family History   Problem Relation Age of Onset    Heart Attack Father     Coronary Art Dis Father     Breast Cancer Paternal Grandmother      Social History     Socioeconomic History    Marital status:      Spouse name: None    Number of children: None    Years of education: None    Highest education level: None   Occupational History    None   Social Needs    Financial resource strain: None    Food insecurity     Worry: None     Inability: None    Transportation needs Medical: None     Non-medical: None   Tobacco Use    Smoking status: Never Smoker    Smokeless tobacco: Never Used   Substance and Sexual Activity    Alcohol use: Not Currently     Alcohol/week: 0.0 standard drinks     Comment: Social use    Drug use: No    Sexual activity: Yes     Partners: Male     Birth control/protection: Surgical     Comment: btl   Lifestyle    Physical activity     Days per week: None     Minutes per session: None    Stress: None   Relationships    Social connections     Talks on phone: None     Gets together: None     Attends Sikhism service: None     Active member of club or organization: None     Attends meetings of clubs or organizations: None     Relationship status: None    Intimate partner violence     Fear of current or ex partner: None     Emotionally abused: None     Physically abused: None     Forced sexual activity: None   Other Topics Concern    None   Social History Narrative    None     Patient's medications,allergies, past medical, surgical, social and family histories were reviewed andupdated as appropriate. Review of Systems   Constitutional: Positive for chills. Negative for fever. HENT: Positive for ear pain. Negative for congestion, facial swelling, hearing loss, sinus pressure, sinus pain, sore throat and tinnitus. Eyes: Positive for visual disturbance. Negative for photophobia, pain and redness. Respiratory: Negative for chest tightness and shortness of breath. Cardiovascular: Negative for chest pain and palpitations. Gastrointestinal: Negative for abdominal distention, abdominal pain, diarrhea, nausea and vomiting. Endocrine: Negative for cold intolerance and heat intolerance. Skin: Negative for pallor and rash. Neurological: Positive for dizziness and light-headedness. Negative for weakness and headaches. Psychiatric/Behavioral: Negative for agitation and behavioral problems.        Objective:   Physical Exam  Constitutional: Appearance: She is well-developed. HENT:      Head: Normocephalic and atraumatic. Right Ear: Tympanic membrane, ear canal and external ear normal.      Left Ear: Tympanic membrane, ear canal and external ear normal.      Ears:      Comments: Some old dry blood on TM, minimal. No lesions noted. Nose: Nose normal.      Mouth/Throat:      Pharynx: Uvula midline. Eyes:      Pupils: Pupils are equal, round, and reactive to light. Neck:      Musculoskeletal: Normal range of motion and neck supple. Cardiovascular:      Rate and Rhythm: Normal rate. Heart sounds: Normal heart sounds. Pulmonary:      Effort: Pulmonary effort is normal. No respiratory distress. Abdominal:      General: There is no distension. Palpations: Abdomen is soft. Skin:     General: Skin is warm and dry. Neurological:      Mental Status: She is alert and oriented to person, place, and time. Psychiatric:         Behavior: Behavior normal.                     Assessment:       Diagnosis Orders   1. Seasonal allergic rhinitis due to other allergic trigger     2. Sensorineural hearing loss (SNHL) of both ears     3. Ear pain, bilateral     4. Lightheadedness           Plan:      Audiogram and tymp reviewed with patient. She has mild SNHL. This could contribute to her tinnitus. Blood in ear could be secondary residual from her previous facelift as well as contributing to her ear pain. No intervention needed  Lightheadedness/dizziness - could be secondary to orthostatic, central or peripheral. Will consider VNG/referal to cardio/neuro if symptoms persist   Flonase, ordered. Followup in 1 month(s)                     Juan Miguel Palmer  1965    I have discussed the case, including pertinent history and exam findings with the resident. I have seen and examined the patient and the key elements of the encounter have been performed by me.  I agree with the assessment, plan and orders as documented by the  resident Remainder of medical problems as per  resident note. Patient seen and examined. Agree with above exam, assessment and plan.       Electronically signed by Leti Parker DO on 1/19/21 at 1:25 PM EST

## 2021-01-12 NOTE — PROGRESS NOTES
This patient was referred for audiometric/tympanometric testing by Dr. Sherrie Mccabe due to ear pain, hearing loss, and tinnitus. Patient also reported dizziness. Audiometry revealed an essentially mild sensorineural hearing loss, bilaterally. Reliability was good. Speech reception thresholds were in good agreement with the pure tone averages, bilaterally. Speech discrimination scores were excellent, bilaterally. Tympanometry revealed normal middle ear peak pressure and compliance, bilaterally. The results were reviewed with the patient. Recommendations for follow up will be made pending physician consult.     Hiwot Balderrama [facility]  [id number]  Electronically signed by Hiwot Balderrama on 1/12/2021 at 1:43 PM

## 2021-01-18 ENCOUNTER — APPOINTMENT (OUTPATIENT)
Dept: CT IMAGING | Age: 56
End: 2021-01-18
Payer: COMMERCIAL

## 2021-01-18 ENCOUNTER — HOSPITAL ENCOUNTER (EMERGENCY)
Age: 56
Discharge: ANOTHER HOSPITAL | End: 2021-01-19
Attending: EMERGENCY MEDICINE
Payer: COMMERCIAL

## 2021-01-18 DIAGNOSIS — R26.0 ATAXIC GAIT: ICD-10-CM

## 2021-01-18 DIAGNOSIS — H70.92 MASTOIDITIS OF LEFT SIDE: Primary | ICD-10-CM

## 2021-01-18 PROBLEM — R27.0 ATAXIA: Status: ACTIVE | Noted: 2021-01-18

## 2021-01-18 LAB
ALBUMIN SERPL-MCNC: 4.2 G/DL (ref 3.5–5.2)
ALP BLD-CCNC: 73 U/L (ref 35–104)
ALT SERPL-CCNC: 14 U/L (ref 0–32)
ANION GAP SERPL CALCULATED.3IONS-SCNC: 11 MMOL/L (ref 7–16)
AST SERPL-CCNC: 21 U/L (ref 0–31)
BACTERIA: NORMAL /HPF
BASOPHILS ABSOLUTE: 0.08 E9/L (ref 0–0.2)
BASOPHILS RELATIVE PERCENT: 0.9 % (ref 0–2)
BILIRUB SERPL-MCNC: 0.3 MG/DL (ref 0–1.2)
BILIRUBIN URINE: NEGATIVE
BLOOD, URINE: NEGATIVE
BUN BLDV-MCNC: 21 MG/DL (ref 6–20)
CALCIUM SERPL-MCNC: 9.9 MG/DL (ref 8.6–10.2)
CHLORIDE BLD-SCNC: 100 MMOL/L (ref 98–107)
CLARITY: CLEAR
CO2: 26 MMOL/L (ref 22–29)
COLOR: YELLOW
CREAT SERPL-MCNC: 0.9 MG/DL (ref 0.5–1)
EOSINOPHILS ABSOLUTE: 0.17 E9/L (ref 0.05–0.5)
EOSINOPHILS RELATIVE PERCENT: 1.8 % (ref 0–6)
GFR AFRICAN AMERICAN: >60
GFR NON-AFRICAN AMERICAN: >60 ML/MIN/1.73
GLUCOSE BLD-MCNC: 97 MG/DL (ref 74–99)
GLUCOSE URINE: NEGATIVE MG/DL
HCT VFR BLD CALC: 40.7 % (ref 34–48)
HEMOGLOBIN: 13.5 G/DL (ref 11.5–15.5)
IMMATURE GRANULOCYTES #: 0.03 E9/L
IMMATURE GRANULOCYTES %: 0.3 % (ref 0–5)
KETONES, URINE: NEGATIVE MG/DL
LEUKOCYTE ESTERASE, URINE: NEGATIVE
LYMPHOCYTES ABSOLUTE: 2.17 E9/L (ref 1.5–4)
LYMPHOCYTES RELATIVE PERCENT: 23.3 % (ref 20–42)
MCH RBC QN AUTO: 31.8 PG (ref 26–35)
MCHC RBC AUTO-ENTMCNC: 33.2 % (ref 32–34.5)
MCV RBC AUTO: 95.8 FL (ref 80–99.9)
MONOCYTES ABSOLUTE: 0.99 E9/L (ref 0.1–0.95)
MONOCYTES RELATIVE PERCENT: 10.6 % (ref 2–12)
NEUTROPHILS ABSOLUTE: 5.86 E9/L (ref 1.8–7.3)
NEUTROPHILS RELATIVE PERCENT: 63.1 % (ref 43–80)
NITRITE, URINE: NEGATIVE
PDW BLD-RTO: 12.4 FL (ref 11.5–15)
PH UA: 6 (ref 5–9)
PLATELET # BLD: 294 E9/L (ref 130–450)
PMV BLD AUTO: 10.2 FL (ref 7–12)
POTASSIUM SERPL-SCNC: 4.1 MMOL/L (ref 3.5–5)
PROTEIN UA: NEGATIVE MG/DL
RBC # BLD: 4.25 E12/L (ref 3.5–5.5)
RBC UA: NORMAL /HPF (ref 0–2)
SARS-COV-2, NAAT: NOT DETECTED
SODIUM BLD-SCNC: 137 MMOL/L (ref 132–146)
SPECIFIC GRAVITY UA: 1.01 (ref 1–1.03)
TOTAL PROTEIN: 7.7 G/DL (ref 6.4–8.3)
UROBILINOGEN, URINE: 0.2 E.U./DL
WBC # BLD: 9.3 E9/L (ref 4.5–11.5)
WBC UA: NORMAL /HPF (ref 0–5)

## 2021-01-18 PROCEDURE — 70450 CT HEAD/BRAIN W/O DYE: CPT

## 2021-01-18 PROCEDURE — 80053 COMPREHEN METABOLIC PANEL: CPT

## 2021-01-18 PROCEDURE — 81001 URINALYSIS AUTO W/SCOPE: CPT

## 2021-01-18 PROCEDURE — 99285 EMERGENCY DEPT VISIT HI MDM: CPT

## 2021-01-18 PROCEDURE — 96366 THER/PROPH/DIAG IV INF ADDON: CPT

## 2021-01-18 PROCEDURE — 96375 TX/PRO/DX INJ NEW DRUG ADDON: CPT

## 2021-01-18 PROCEDURE — 96365 THER/PROPH/DIAG IV INF INIT: CPT

## 2021-01-18 PROCEDURE — 2580000003 HC RX 258: Performed by: STUDENT IN AN ORGANIZED HEALTH CARE EDUCATION/TRAINING PROGRAM

## 2021-01-18 PROCEDURE — U0002 COVID-19 LAB TEST NON-CDC: HCPCS

## 2021-01-18 PROCEDURE — 6360000002 HC RX W HCPCS: Performed by: STUDENT IN AN ORGANIZED HEALTH CARE EDUCATION/TRAINING PROGRAM

## 2021-01-18 PROCEDURE — 93005 ELECTROCARDIOGRAM TRACING: CPT | Performed by: STUDENT IN AN ORGANIZED HEALTH CARE EDUCATION/TRAINING PROGRAM

## 2021-01-18 PROCEDURE — 85025 COMPLETE CBC W/AUTO DIFF WBC: CPT

## 2021-01-18 RX ORDER — 0.9 % SODIUM CHLORIDE 0.9 %
1000 INTRAVENOUS SOLUTION INTRAVENOUS ONCE
Status: COMPLETED | OUTPATIENT
Start: 2021-01-18 | End: 2021-01-18

## 2021-01-18 RX ADMIN — WATER 1 G: 1 INJECTION INTRAMUSCULAR; INTRAVENOUS; SUBCUTANEOUS at 22:35

## 2021-01-18 RX ADMIN — VANCOMYCIN HYDROCHLORIDE 1.5 G: 10 INJECTION, POWDER, LYOPHILIZED, FOR SOLUTION INTRAVENOUS at 22:55

## 2021-01-18 RX ADMIN — SODIUM CHLORIDE 1000 ML: 9 INJECTION, SOLUTION INTRAVENOUS at 20:50

## 2021-01-19 ENCOUNTER — APPOINTMENT (OUTPATIENT)
Dept: MRI IMAGING | Age: 56
End: 2021-01-19
Attending: INTERNAL MEDICINE
Payer: COMMERCIAL

## 2021-01-19 ENCOUNTER — APPOINTMENT (OUTPATIENT)
Dept: CT IMAGING | Age: 56
End: 2021-01-19
Attending: INTERNAL MEDICINE
Payer: COMMERCIAL

## 2021-01-19 ENCOUNTER — HOSPITAL ENCOUNTER (OUTPATIENT)
Age: 56
Setting detail: OBSERVATION
Discharge: HOME | End: 2021-01-19
Attending: INTERNAL MEDICINE | Admitting: INTERNAL MEDICINE
Payer: COMMERCIAL

## 2021-01-19 VITALS
RESPIRATION RATE: 18 BRPM | SYSTOLIC BLOOD PRESSURE: 107 MMHG | OXYGEN SATURATION: 98 % | TEMPERATURE: 97.4 F | DIASTOLIC BLOOD PRESSURE: 62 MMHG | BODY MASS INDEX: 27.32 KG/M2 | HEART RATE: 57 BPM | HEIGHT: 66 IN | WEIGHT: 170 LBS

## 2021-01-19 VITALS
RESPIRATION RATE: 16 BRPM | HEART RATE: 54 BPM | HEIGHT: 66 IN | OXYGEN SATURATION: 97 % | SYSTOLIC BLOOD PRESSURE: 102 MMHG | BODY MASS INDEX: 28.12 KG/M2 | WEIGHT: 175 LBS | TEMPERATURE: 98.7 F | DIASTOLIC BLOOD PRESSURE: 53 MMHG

## 2021-01-19 LAB
ANION GAP SERPL CALCULATED.3IONS-SCNC: 10 MMOL/L (ref 7–16)
BUN BLDV-MCNC: 14 MG/DL (ref 6–20)
CALCIUM SERPL-MCNC: 9.2 MG/DL (ref 8.6–10.2)
CHLORIDE BLD-SCNC: 99 MMOL/L (ref 98–107)
CHOLESTEROL, TOTAL: 217 MG/DL (ref 0–199)
CO2: 29 MMOL/L (ref 22–29)
CREAT SERPL-MCNC: 0.8 MG/DL (ref 0.5–1)
EKG ATRIAL RATE: 54 BPM
EKG P AXIS: 44 DEGREES
EKG P-R INTERVAL: 96 MS
EKG Q-T INTERVAL: 452 MS
EKG QRS DURATION: 88 MS
EKG QTC CALCULATION (BAZETT): 428 MS
EKG R AXIS: 52 DEGREES
EKG T AXIS: 47 DEGREES
EKG VENTRICULAR RATE: 54 BPM
GFR AFRICAN AMERICAN: >60
GFR NON-AFRICAN AMERICAN: >60 ML/MIN/1.73
GLUCOSE BLD-MCNC: 83 MG/DL (ref 74–99)
HBA1C MFR BLD: 5.4 % (ref 4–5.6)
HCT VFR BLD CALC: 41.1 % (ref 34–48)
HDLC SERPL-MCNC: 51 MG/DL
HEMOGLOBIN: 13.1 G/DL (ref 11.5–15.5)
LDL CHOLESTEROL CALCULATED: 143 MG/DL (ref 0–99)
MCH RBC QN AUTO: 31.2 PG (ref 26–35)
MCHC RBC AUTO-ENTMCNC: 31.9 % (ref 32–34.5)
MCV RBC AUTO: 97.9 FL (ref 80–99.9)
PDW BLD-RTO: 12.4 FL (ref 11.5–15)
PLATELET # BLD: 284 E9/L (ref 130–450)
PMV BLD AUTO: 10.6 FL (ref 7–12)
POTASSIUM REFLEX MAGNESIUM: 3.9 MMOL/L (ref 3.5–5)
RBC # BLD: 4.2 E12/L (ref 3.5–5.5)
SODIUM BLD-SCNC: 138 MMOL/L (ref 132–146)
TRIGL SERPL-MCNC: 117 MG/DL (ref 0–149)
VLDLC SERPL CALC-MCNC: 23 MG/DL
WBC # BLD: 7.8 E9/L (ref 4.5–11.5)

## 2021-01-19 PROCEDURE — 36415 COLL VENOUS BLD VENIPUNCTURE: CPT

## 2021-01-19 PROCEDURE — 83036 HEMOGLOBIN GLYCOSYLATED A1C: CPT

## 2021-01-19 PROCEDURE — 6370000000 HC RX 637 (ALT 250 FOR IP): Performed by: INTERNAL MEDICINE

## 2021-01-19 PROCEDURE — 70551 MRI BRAIN STEM W/O DYE: CPT

## 2021-01-19 PROCEDURE — 85027 COMPLETE CBC AUTOMATED: CPT

## 2021-01-19 PROCEDURE — 97161 PT EVAL LOW COMPLEX 20 MIN: CPT

## 2021-01-19 PROCEDURE — G0379 DIRECT REFER HOSPITAL OBSERV: HCPCS

## 2021-01-19 PROCEDURE — 6360000004 HC RX CONTRAST MEDICATION: Performed by: RADIOLOGY

## 2021-01-19 PROCEDURE — 6370000000 HC RX 637 (ALT 250 FOR IP): Performed by: FAMILY MEDICINE

## 2021-01-19 PROCEDURE — 80061 LIPID PANEL: CPT

## 2021-01-19 PROCEDURE — G0378 HOSPITAL OBSERVATION PER HR: HCPCS

## 2021-01-19 PROCEDURE — 70496 CT ANGIOGRAPHY HEAD: CPT

## 2021-01-19 PROCEDURE — 80048 BASIC METABOLIC PNL TOTAL CA: CPT

## 2021-01-19 PROCEDURE — 2580000003 HC RX 258: Performed by: INTERNAL MEDICINE

## 2021-01-19 PROCEDURE — 2580000003 HC RX 258: Performed by: RADIOLOGY

## 2021-01-19 PROCEDURE — 70498 CT ANGIOGRAPHY NECK: CPT

## 2021-01-19 RX ORDER — ATORVASTATIN CALCIUM 80 MG/1
80 TABLET, FILM COATED ORAL NIGHTLY
Status: DISCONTINUED | OUTPATIENT
Start: 2021-01-19 | End: 2021-01-19 | Stop reason: HOSPADM

## 2021-01-19 RX ORDER — AMOXICILLIN AND CLAVULANATE POTASSIUM 875; 125 MG/1; MG/1
1 TABLET, FILM COATED ORAL EVERY 12 HOURS SCHEDULED
Qty: 20 TABLET | Refills: 0 | Status: SHIPPED | OUTPATIENT
Start: 2021-01-19 | End: 2021-01-29

## 2021-01-19 RX ORDER — MULTIVITAMIN WITH IRON
1 TABLET ORAL DAILY
Status: DISCONTINUED | OUTPATIENT
Start: 2021-01-19 | End: 2021-01-19 | Stop reason: HOSPADM

## 2021-01-19 RX ORDER — ASPIRIN 300 MG/1
300 SUPPOSITORY RECTAL DAILY
Status: DISCONTINUED | OUTPATIENT
Start: 2021-01-19 | End: 2021-01-19 | Stop reason: HOSPADM

## 2021-01-19 RX ORDER — SODIUM CHLORIDE 0.9 % (FLUSH) 0.9 %
10 SYRINGE (ML) INJECTION
Status: COMPLETED | OUTPATIENT
Start: 2021-01-19 | End: 2021-01-19

## 2021-01-19 RX ORDER — ONDANSETRON 2 MG/ML
4 INJECTION INTRAMUSCULAR; INTRAVENOUS EVERY 6 HOURS PRN
Status: DISCONTINUED | OUTPATIENT
Start: 2021-01-19 | End: 2021-01-19 | Stop reason: HOSPADM

## 2021-01-19 RX ORDER — LACTOBACILLUS RHAMNOSUS GG 10B CELL
1 CAPSULE ORAL DAILY
Status: DISCONTINUED | OUTPATIENT
Start: 2021-01-19 | End: 2021-01-19 | Stop reason: HOSPADM

## 2021-01-19 RX ORDER — OMEGA-3-ACID ETHYL ESTERS 1 G/1
1000 CAPSULE, LIQUID FILLED ORAL DAILY
Status: DISCONTINUED | OUTPATIENT
Start: 2021-01-19 | End: 2021-01-19 | Stop reason: HOSPADM

## 2021-01-19 RX ORDER — PANTOPRAZOLE SODIUM 20 MG/1
40 TABLET, DELAYED RELEASE ORAL DAILY
Status: DISCONTINUED | OUTPATIENT
Start: 2021-01-19 | End: 2021-01-19 | Stop reason: HOSPADM

## 2021-01-19 RX ORDER — SODIUM CHLORIDE 0.9 % (FLUSH) 0.9 %
10 SYRINGE (ML) INJECTION PRN
Status: DISCONTINUED | OUTPATIENT
Start: 2021-01-19 | End: 2021-01-19 | Stop reason: HOSPADM

## 2021-01-19 RX ORDER — SODIUM CHLORIDE 0.9 % (FLUSH) 0.9 %
10 SYRINGE (ML) INJECTION EVERY 12 HOURS SCHEDULED
Status: DISCONTINUED | OUTPATIENT
Start: 2021-01-19 | End: 2021-01-19 | Stop reason: HOSPADM

## 2021-01-19 RX ORDER — FLUTICASONE PROPIONATE 50 MCG
2 SPRAY, SUSPENSION (ML) NASAL DAILY
Status: DISCONTINUED | OUTPATIENT
Start: 2021-01-19 | End: 2021-01-19 | Stop reason: HOSPADM

## 2021-01-19 RX ORDER — PROMETHAZINE HYDROCHLORIDE 25 MG/1
12.5 TABLET ORAL EVERY 6 HOURS PRN
Status: DISCONTINUED | OUTPATIENT
Start: 2021-01-19 | End: 2021-01-19 | Stop reason: HOSPADM

## 2021-01-19 RX ORDER — AMOXICILLIN AND CLAVULANATE POTASSIUM 875; 125 MG/1; MG/1
1 TABLET, FILM COATED ORAL EVERY 12 HOURS SCHEDULED
Status: DISCONTINUED | OUTPATIENT
Start: 2021-01-19 | End: 2021-01-19 | Stop reason: HOSPADM

## 2021-01-19 RX ORDER — SPIRONOLACTONE 25 MG/1
100 TABLET ORAL 2 TIMES DAILY
Status: DISCONTINUED | OUTPATIENT
Start: 2021-01-19 | End: 2021-01-19 | Stop reason: HOSPADM

## 2021-01-19 RX ORDER — OMEGA-3S/DHA/EPA/FISH OIL/D3 300MG-1000
400 CAPSULE ORAL DAILY
Status: DISCONTINUED | OUTPATIENT
Start: 2021-01-19 | End: 2021-01-19 | Stop reason: HOSPADM

## 2021-01-19 RX ORDER — ESCITALOPRAM OXALATE 10 MG/1
20 TABLET ORAL DAILY
Status: DISCONTINUED | OUTPATIENT
Start: 2021-01-19 | End: 2021-01-19 | Stop reason: HOSPADM

## 2021-01-19 RX ORDER — AMOXICILLIN AND CLAVULANATE POTASSIUM 875; 125 MG/1; MG/1
1 TABLET, FILM COATED ORAL EVERY 12 HOURS SCHEDULED
Qty: 20 TABLET | Refills: 0 | Status: SHIPPED | OUTPATIENT
Start: 2021-01-19 | End: 2021-01-19

## 2021-01-19 RX ORDER — ASPIRIN 81 MG/1
81 TABLET ORAL DAILY
Status: DISCONTINUED | OUTPATIENT
Start: 2021-01-19 | End: 2021-01-19 | Stop reason: HOSPADM

## 2021-01-19 RX ORDER — POLYETHYLENE GLYCOL 3350 17 G/17G
17 POWDER, FOR SOLUTION ORAL DAILY PRN
Status: DISCONTINUED | OUTPATIENT
Start: 2021-01-19 | End: 2021-01-19 | Stop reason: HOSPADM

## 2021-01-19 RX ORDER — BIOTIN 10 MG
5000 TABLET ORAL DAILY
Status: DISCONTINUED | OUTPATIENT
Start: 2021-01-19 | End: 2021-01-19 | Stop reason: CLARIF

## 2021-01-19 RX ADMIN — SPIRONOLACTONE 100 MG: 25 TABLET ORAL at 10:34

## 2021-01-19 RX ADMIN — Medication 1 TABLET: at 10:33

## 2021-01-19 RX ADMIN — IOPAMIDOL 60 ML: 755 INJECTION, SOLUTION INTRAVENOUS at 07:54

## 2021-01-19 RX ADMIN — SODIUM CHLORIDE, PRESERVATIVE FREE 10 ML: 5 INJECTION INTRAVENOUS at 10:33

## 2021-01-19 RX ADMIN — Medication 1 CAPSULE: at 10:34

## 2021-01-19 RX ADMIN — CHOLECALCIFEROL TAB 10 MCG (400 UNIT) 400 UNITS: 10 TAB at 10:34

## 2021-01-19 RX ADMIN — Medication 10 ML: at 07:54

## 2021-01-19 RX ADMIN — AMOXICILLIN AND CLAVULANATE POTASSIUM 1 TABLET: 875; 125 TABLET, FILM COATED ORAL at 12:01

## 2021-01-19 RX ADMIN — PANTOPRAZOLE SODIUM 40 MG: 20 TABLET, DELAYED RELEASE ORAL at 10:33

## 2021-01-19 RX ADMIN — VITAMIN E CAP 100 UNIT 400 UNITS: 100 CAP at 10:35

## 2021-01-19 RX ADMIN — FLUTICASONE PROPIONATE 2 SPRAY: 50 SPRAY, METERED NASAL at 10:31

## 2021-01-19 RX ADMIN — OMEGA-3-ACID ETHYL ESTERS 1000 MG: 900 CAPSULE, LIQUID FILLED ORAL at 10:33

## 2021-01-19 RX ADMIN — ASPIRIN 81 MG: 81 TABLET, COATED ORAL at 10:34

## 2021-01-19 RX ADMIN — ESCITALOPRAM 20 MG: 10 TABLET, FILM COATED ORAL at 10:34

## 2021-01-19 ASSESSMENT — PAIN SCALES - GENERAL: PAINLEVEL_OUTOF10: 0

## 2021-01-19 NOTE — ED PROVIDER NOTES
Constipation, Delivery normal, Depression, Left arm pain, and SOB (shortness of breath). Past Surgical History:  has a past surgical history that includes cyst removal; Breast surgery (Right, 1980); Tubal ligation (1995); Colonoscopy (03/2015); and Facial cosmetic surgery. Social History:  reports that she has never smoked. She has never used smokeless tobacco. She reports previous alcohol use. She reports that she does not use drugs. Family History: family history includes Breast Cancer in her paternal grandmother; Coronary Art Dis in her father; Heart Attack in her father. The patients home medications have been reviewed.     Allergies: Sulfa antibiotics    -------------------------------------------------- RESULTS -------------------------------------------------  All laboratory and radiology results have been personally reviewed by myself   LABS:  Results for orders placed or performed during the hospital encounter of 01/18/21   CBC auto differential   Result Value Ref Range    WBC 9.3 4.5 - 11.5 E9/L    RBC 4.25 3.50 - 5.50 E12/L    Hemoglobin 13.5 11.5 - 15.5 g/dL    Hematocrit 40.7 34.0 - 48.0 %    MCV 95.8 80.0 - 99.9 fL    MCH 31.8 26.0 - 35.0 pg    MCHC 33.2 32.0 - 34.5 %    RDW 12.4 11.5 - 15.0 fL    Platelets 786 084 - 148 E9/L    MPV 10.2 7.0 - 12.0 fL    Neutrophils % 63.1 43.0 - 80.0 %    Immature Granulocytes % 0.3 0.0 - 5.0 %    Lymphocytes % 23.3 20.0 - 42.0 %    Monocytes % 10.6 2.0 - 12.0 %    Eosinophils % 1.8 0.0 - 6.0 %    Basophils % 0.9 0.0 - 2.0 %    Neutrophils Absolute 5.86 1.80 - 7.30 E9/L    Immature Granulocytes # 0.03 E9/L    Lymphocytes Absolute 2.17 1.50 - 4.00 E9/L    Monocytes Absolute 0.99 (H) 0.10 - 0.95 E9/L    Eosinophils Absolute 0.17 0.05 - 0.50 E9/L    Basophils Absolute 0.08 0.00 - 0.20 E9/L   Comprehensive Metabolic Panel   Result Value Ref Range    Sodium 137 132 - 146 mmol/L    Potassium 4.1 3.5 - 5.0 mmol/L    Chloride 100 98 - 107 mmol/L    CO2 26 22 - 29 mmol/L    Anion Gap 11 7 - 16 mmol/L    Glucose 97 74 - 99 mg/dL    BUN 21 (H) 6 - 20 mg/dL    CREATININE 0.9 0.5 - 1.0 mg/dL    GFR Non-African American >60 >=60 mL/min/1.73    GFR African American >60     Calcium 9.9 8.6 - 10.2 mg/dL    Total Protein 7.7 6.4 - 8.3 g/dL    Alb 4.2 3.5 - 5.2 g/dL    Total Bilirubin 0.3 0.0 - 1.2 mg/dL    Alkaline Phosphatase 73 35 - 104 U/L    ALT 14 0 - 32 U/L    AST 21 0 - 31 U/L   Urinalysis with Microscopic   Result Value Ref Range    Color, UA Yellow Straw/Yellow    Clarity, UA Clear Clear    Glucose, Ur Negative Negative mg/dL    Bilirubin Urine Negative Negative    Ketones, Urine Negative Negative mg/dL    Specific Gravity, UA 1.010 1.005 - 1.030    Blood, Urine Negative Negative    pH, UA 6.0 5.0 - 9.0    Protein, UA Negative Negative mg/dL    Urobilinogen, Urine 0.2 <2.0 E.U./dL    Nitrite, Urine Negative Negative    Leukocyte Esterase, Urine Negative Negative    WBC, UA 0-1 0 - 5 /HPF    RBC, UA NONE 0 - 2 /HPF    Bacteria, UA NONE SEEN None Seen /HPF   COVID-19   Result Value Ref Range    SARS-CoV-2, NAAT Not Detected Not Detected   EKG 12 Lead   Result Value Ref Range    Ventricular Rate 54 BPM    Atrial Rate 54 BPM    P-R Interval 96 ms    QRS Duration 88 ms    Q-T Interval 452 ms    QTc Calculation (Bazett) 428 ms    P Axis 44 degrees    R Axis 52 degrees    T Axis 47 degrees       RADIOLOGY:  Interpreted by Radiologist.  CT HEAD WO CONTRAST   Final Result   No acute intracranial abnormality. Consider further evaluation with MRI if clinically indicated. Partial opacification of bilateral mastoid air cells, right greater than   left, with small air-fluid levels, consistent with mastoiditis.          ------------------------- NURSING NOTES AND VITALS REVIEWED ---------------------------   The nursing notes within the ED encounter and vital signs as below have been reviewed.    BP (!) 100/48   Pulse 53   Temp 98.7 °F (37.1 °C) (Oral)   Resp 16   Ht 5' 6\" (1.676 m)

## 2021-01-19 NOTE — PROGRESS NOTES
2138 Called [facility] center at this time for patient transfer to [facility] for Stroke like symptoms and ataxia  2202 Dr. Daksha Enamorado called and spoke with Dr. Servando Zelaya and he has accepted the patient to [facility] main  [address] Access center called with a bed assignment, patient going to room 8517 call Nurse to Nurse to [phone] PAS YAZMIN at [facility] 403

## 2021-01-19 NOTE — PROGRESS NOTES
Occupational Therapy  OT SESSION ATTEMPT     Date:2021  Patient Name: Thalia Capps  MRN: [id number]  : 1965  Room: [facility]     OT order received and appreciated. Chart review completed. Observed pt ambulating in room and bathroom (from transport cart) independently and completing self-care tasks in bathrm w/o assistance. Per Pt and RN, independent w/ additional ADL's. Vision=intact, sensation=intact, strength=WFL. Skilled OT not indicated at this time, no home going needs. Please re-consult if change.       Bhavani OTR/L #[id number]

## 2021-01-19 NOTE — H&P
Hospitalist History & Physical      PCP: Olga Montez MD    Date of Admission: 1/19/2021    Date of Service: Pt seen/examined on 1/19/2021   Chief Complaint:  had no chief complaint listed for this encounter. History Of Present Illness:    Ms. Abena Silveira, a 63y.o. year old female  who  has a past medical history of Abdominal cramping, Anxiety, Asthma, Constipation, Delivery normal, Depression, Left arm pain, and SOB (shortness of breath). Patient presents emergency department with complaints of feeling off balance. Then about 2 weeks ago. Also noting left-sided ear pain for the last 3 weeks. Normally when she tries to walk. Metal signs assessed emergency department within normal limits and stable. Laboratory studies unremarkable. CTA head and neck unremarkable. Past Medical History:        Diagnosis Date    Abdominal cramping     Anxiety     Asthma     controlled per patient    Constipation     Delivery normal     x2    Depression     Left arm pain     had cardiac work-up 2012? 3/16/15 -resolved    SOB (shortness of breath)     resolved       Past Surgical History:        Procedure Laterality Date    BREAST SURGERY Right 1980    cyst    COLONOSCOPY  03/2015    Dr. Johann Veloz, repeat in 10 years    CYST REMOVAL      tailbone, face    FACIAL COSMETIC SURGERY      TUBAL LIGATION  1995       Medications Prior to Admission:      Prior to Admission medications    Medication Sig Start Date End Date Taking? Authorizing Provider   LINZESS 290 MCG CAPS capsule Take 290 mcg by mouth every morning (before breakfast)  12/21/20  Yes Historical Provider, MD   fluticasone (FLONASE) 50 MCG/ACT nasal spray 2 sprays by Nasal route daily Use in both nostrils.  1/12/21  Yes Kang Pettit Missael, DO   OMEPRAZOLE PO Take 20 mg by mouth daily    Yes Historical Provider, MD   spironolactone (ALDACTONE) 100 MG tablet Take 1 tablet by mouth 2 times daily 5/21/20  Yes Casper Ball PA-C escitalopram (LEXAPRO) 20 MG tablet Take 20 mg by mouth daily  5/1/19  Yes Historical Provider, MD   Omega-3 Krill Oil 300 MG CAPS Take 350 mg by mouth daily    Yes Historical Provider, MD   vitamin E 1000 UNITS capsule Take 400 Units by mouth 2 times daily    Yes Historical Provider, MD   Biotin 10 MG tablet Take 5,000 mg by mouth daily    Yes Historical Provider, MD   vitamin D (CHOLECALCIFEROL) 1000 UNIT TABS tablet Take 400 Units by mouth daily    Yes Historical Provider, MD   Multiple Vitamin (MULTI VITAMIN DAILY PO) Take 1 tablet by mouth daily Indications: bid    Yes Historical Provider, MD   Probiotic Product (PROBIOTIC DAILY PO) Take 1 tablet by mouth daily    Yes Historical Provider, MD       Allergies:  Sulfa antibiotics    Social History:    TOBACCO:   reports that she has never smoked. She has never used smokeless tobacco.  ETOH:   reports previous alcohol use. Family History:    Reviewed in detail and negative for DM, CAD, Cancer, CVA. Positive as follows\"      Problem Relation Age of Onset    Heart Attack Father     Coronary Art Dis Father     Breast Cancer Paternal Grandmother        REVIEW OF SYSTEMS:   Pertinent positives as noted in the HPI. All other systems reviewed and negative. PHYSICAL EXAM:  /72   Pulse 60   Temp 97.5 °F (36.4 °C) (Temporal)   Resp 18   Ht 5' 6\" (1.676 m)   Wt 170 lb (77.1 kg)   SpO2 99%   BMI 27.44 kg/m²   General appearance: No apparent distress, appears stated age and cooperative. HEENT: Normal cephalic, atraumatic without obvious deformity. Pupils equal, round, and reactive to light. Extra ocular muscles intact. Conjunctivae/corneas clear. Neck: Supple, with full range of motion. No jugular venous distention. Trachea midline. Respiratory: Clear to auscultation bilaterally  Cardiovascular: Regular rate and rhythm  Abdomen: Soft, nontender, distended  Musculoskeletal: No clubbing, cyanosis, edema of bilateral lower extremities.  Brisk capillary refill. Skin: Normal skin color. No rashes or lesions. Neurologic:  Neurovascularly intact without any focal sensory/motor deficits. Cranial nerves: II-XII intact, grossly non-focal.  Ataxic gait    Reviewed EKG and CXR personally      CBC:   Recent Labs     01/18/21 2030   WBC 9.3   RBC 4.25   HGB 13.5   HCT 40.7   MCV 95.8   RDW 12.4        BMP:   Recent Labs     01/18/21 2030      K 4.1      CO2 26   BUN 21*   CREATININE 0.9     LFT:  Recent Labs     01/18/21 2030   PROT 7.7   ALKPHOS 73   ALT 14   AST 21   BILITOT 0.3     CE:  No results for input(s): Sarina Baseman in the last 72 hours. PT/INR: No results for input(s): INR, APTT in the last 72 hours. BNP: No results for input(s): BNP in the last 72 hours.   ESR:   Lab Results   Component Value Date    SEDRATE 15 02/18/2015     CRP: No results found for: CRP  D Dimer:   Lab Results   Component Value Date    DDIMER <200 01/06/2021      Folate and B12:   Lab Results   Component Value Date    LNJOIEJU15 171 07/06/2016   , No results found for: FOLATE  Lactic Acid: No results found for: LACTA  Thyroid Studies:   Lab Results   Component Value Date    TSH 2.460 07/16/2020    O9OVZYU 7.9 02/10/2011       Oupatient labs:  Lab Results   Component Value Date    CHOL 203 (H) 05/21/2020    TRIG 77 05/21/2020    HDL 67 05/21/2020    LDLCALC 121 (H) 05/21/2020    TSH 2.460 07/16/2020    LABA1C 5.0 10/08/2020       Urinalysis:    Lab Results   Component Value Date    NITRU Negative 01/18/2021    WBCUA 0-1 01/18/2021    BACTERIA NONE SEEN 01/18/2021    RBCUA NONE 01/18/2021    RBCUA 0-1 10/14/2013    BLOODU Negative 01/18/2021    SPECGRAV 1.010 01/18/2021    GLUCOSEU Negative 01/18/2021       Imaging:  Ct Head Wo Contrast    Result Date: 1/18/2021  EXAMINATION: CT OF THE HEAD WITHOUT CONTRAST  1/18/2021 7:21 pm TECHNIQUE: CT of the head was performed without the administration of intravenous contrast. Dose modulation, iterative reconstruction, and/or weight based adjustment of the mA/kV was utilized to reduce the radiation dose to as low as reasonably achievable. COMPARISON: None. HISTORY: ORDERING SYSTEM PROVIDED HISTORY: ataxia x 1 week concern for cerebellar stroke TECHNOLOGIST PROVIDED HISTORY: Reason for exam:->ataxia x 1 week concern for cerebellar stroke Has a \"code stroke\" or \"stroke alert\" been called? ->No FINDINGS: BRAIN/VENTRICLES: There is no acute intracranial hemorrhage, mass effect or midline shift. No abnormal extra-axial fluid collection. The gray-white differentiation is maintained without evidence of an acute infarct. There is no evidence of hydrocephalus. ORBITS: The visualized portion of the orbits demonstrate no acute abnormality. SINUSES: The visualized paranasal sinuses  demonstrate no acute abnormality. Partial opacification of bilateral mastoid air cells, right greater than left with small air-fluid levels, consistent with mastoiditis. SOFT TISSUES/SKULL:  No acute abnormality of the visualized skull or soft tissues. No acute intracranial abnormality. Consider further evaluation with MRI if clinically indicated. Partial opacification of bilateral mastoid air cells, right greater than left, with small air-fluid levels, consistent with mastoiditis. Xr Chest Portable    Result Date: 1/6/2021  EXAMINATION: ONE XRAY VIEW OF THE CHEST 1/6/2021 1:17 pm COMPARISON: None. HISTORY: ORDERING SYSTEM PROVIDED HISTORY: Shortness of breath TECHNOLOGIST PROVIDED HISTORY: Reason for exam:->Shortness of breath FINDINGS: The lungs are without acute focal process. There is no effusion or pneumothorax. The cardiomediastinal silhouette is without acute process. The osseous structures are without acute process. No acute process.     Cta Neck W Contrast    Result Date: 1/19/2021  EXAMINATION: CTA OF THE HEAD WITH CONTRAST; CTA OF THE NECK 1/19/2021 7:52 am: TECHNIQUE: CTA of the head/brain was performed with the administration of intravenous contrast. Multiplanar reformatted images are provided for review. MIP images are provided for review. Dose modulation, iterative reconstruction, and/or weight based adjustment of the mA/kV was utilized to reduce the radiation dose to as low as reasonably achievable.; CTA of the neck was performed with the administration of intravenous contrast. Multiplanar reformatted images are provided for review. MIP images are provided for review. Stenosis of the internal carotid arteries measured using NASCET criteria. Dose modulation, iterative reconstruction, and/or weight based adjustment of the mA/kV was utilized to reduce the radiation dose to as low as reasonably achievable. Noncontrast CT of the head with reconstructed 2-D images are also provided for review. COMPARISON: Unenhanced head CT dated 01/18/2021 and MRI brain dated 01/19/2021 HISTORY: ORDERING SYSTEM PROVIDED HISTORY: ataxia TECHNOLOGIST PROVIDED HISTORY: Reason for exam:->ataxia Has a \"code stroke\" or \"stroke alert\" been called? ->No What reading provider will be dictating this exam?->CRC FINDINGS: CT HEAD: BRAIN/VENTRICLES:  No acute intracranial hemorrhage or extraaxial fluid collection. Grey-white differentiation is maintained. No evidence of mass, mass effect or midline shift. No evidence of hydrocephalus. There is a tiny stable hypodensity in the left basal ganglia on series 15, image 47 that represent an old lacunar infarct or prominent perivascular space. ORBITS: The visualized portion of the orbits demonstrate no acute abnormality. SINUSES:  The visualized paranasal sinuses demonstrate no acute abnormality. Bilateral mastoid effusions are again noted, larger on the right, similar compared to the prior study. This could be related to acute or chronic inflammatory process. SOFT TISSUES/SKULL: No acute abnormality of the visualized skull or soft tissues. CTA NECK: AORTIC ARCH/ARCH VESSELS: No dissection or arterial injury.   No significant stenosis of the brachiocephalic or subclavian arteries. CAROTID ARTERIES: No dissection, arterial injury, or hemodynamically significant stenosis by NASCET criteria. There is mild partially calcified plaque at the posterolateral aspect of the left carotid bifurcation. There is 0% stenosis of both ICAs by NASCET criteria. VERTEBRAL ARTERIES: No dissection, arterial injury, or significant stenosis. A small portion of the proximal left vertebral artery is obscured by artifact from dense contrast in adjacent veins. SOFT TISSUES: The lung apices are clear. No cervical or superior mediastinal lymphadenopathy. The larynx and pharynx are unremarkable. No acute abnormality of the salivary and thyroid glands. BONES: No acute osseous abnormality. CTA HEAD: ANTERIOR CIRCULATION: No significant stenosis of the intracranial internal carotid, anterior cerebral, or middle cerebral arteries. No aneurysm. POSTERIOR CIRCULATION: No significant stenosis of the vertebral, basilar, or posterior cerebral arteries. No aneurysm. OTHER: No dural venous sinus thrombosis on this non-dedicated study    1. No acute intracranial abnormality. 2. Possible tiny old left basal ganglia lacunar infarct. No significant change. 3. Unremarkable CTA of the head and neck. Cta Head W Contrast    Result Date: 1/19/2021  EXAMINATION: CTA OF THE HEAD WITH CONTRAST; CTA OF THE NECK 1/19/2021 7:52 am: TECHNIQUE: CTA of the head/brain was performed with the administration of intravenous contrast. Multiplanar reformatted images are provided for review. MIP images are provided for review. Dose modulation, iterative reconstruction, and/or weight based adjustment of the mA/kV was utilized to reduce the radiation dose to as low as reasonably achievable.; CTA of the neck was performed with the administration of intravenous contrast. Multiplanar reformatted images are provided for review. MIP images are provided for review.  Stenosis of the internal carotid arteries measured using NASCET criteria. Dose modulation, iterative reconstruction, and/or weight based adjustment of the mA/kV was utilized to reduce the radiation dose to as low as reasonably achievable. Noncontrast CT of the head with reconstructed 2-D images are also provided for review. COMPARISON: Unenhanced head CT dated 01/18/2021 and MRI brain dated 01/19/2021 HISTORY: ORDERING SYSTEM PROVIDED HISTORY: ataxia TECHNOLOGIST PROVIDED HISTORY: Reason for exam:->ataxia Has a \"code stroke\" or \"stroke alert\" been called? ->No What reading provider will be dictating this exam?->CRC FINDINGS: CT HEAD: BRAIN/VENTRICLES:  No acute intracranial hemorrhage or extraaxial fluid collection. Grey-white differentiation is maintained. No evidence of mass, mass effect or midline shift. No evidence of hydrocephalus. There is a tiny stable hypodensity in the left basal ganglia on series 15, image 47 that represent an old lacunar infarct or prominent perivascular space. ORBITS: The visualized portion of the orbits demonstrate no acute abnormality. SINUSES:  The visualized paranasal sinuses demonstrate no acute abnormality. Bilateral mastoid effusions are again noted, larger on the right, similar compared to the prior study. This could be related to acute or chronic inflammatory process. SOFT TISSUES/SKULL: No acute abnormality of the visualized skull or soft tissues. CTA NECK: AORTIC ARCH/ARCH VESSELS: No dissection or arterial injury. No significant stenosis of the brachiocephalic or subclavian arteries. CAROTID ARTERIES: No dissection, arterial injury, or hemodynamically significant stenosis by NASCET criteria. There is mild partially calcified plaque at the posterolateral aspect of the left carotid bifurcation. There is 0% stenosis of both ICAs by NASCET criteria. VERTEBRAL ARTERIES: No dissection, arterial injury, or significant stenosis.  A small portion of the proximal left vertebral artery is obscured by artifact from dense contrast in adjacent veins. SOFT TISSUES: The lung apices are clear. No cervical or superior mediastinal lymphadenopathy. The larynx and pharynx are unremarkable. No acute abnormality of the salivary and thyroid glands. BONES: No acute osseous abnormality. CTA HEAD: ANTERIOR CIRCULATION: No significant stenosis of the intracranial internal carotid, anterior cerebral, or middle cerebral arteries. No aneurysm. POSTERIOR CIRCULATION: No significant stenosis of the vertebral, basilar, or posterior cerebral arteries. No aneurysm. OTHER: No dural venous sinus thrombosis on this non-dedicated study    1. No acute intracranial abnormality. 2. Possible tiny old left basal ganglia lacunar infarct. No significant change. 3. Unremarkable CTA of the head and neck. Mri Brain Without Contrast    Result Date: 1/19/2021  EXAMINATION: MRI OF THE BRAIN WITHOUT CONTRAST  1/19/2021 7:11 am TECHNIQUE: Multiplanar multisequence MRI of the brain was performed without the administration of intravenous contrast. COMPARISON: Head CT 01/18/2021 HISTORY: ORDERING SYSTEM PROVIDED HISTORY: ataxia TECHNOLOGIST PROVIDED HISTORY: Reason for exam:->ataxia What reading provider will be dictating this exam?->CRC FINDINGS: INTERNAL AUDITORY CANALS: No mass within the cerebellopontine angle cisterns or internal auditory canals. No abnormality seen along of the facial or vestibulocochlear nerves. The inner ear structures appear unremarkable. The middle ear cavities are clear. The cisternal segments of the trigeminal nerves appear unremarkable. INTRACRANIAL STRUCTURES/VENTRICLES:  No acute infarction. No mass effect or midline shift. No acute intracranial hemorrhage. The ventricles and sulci are normal in size and configuration. The sellar/suprasellar regions appear unremarkable. The normal signal voids within the major intracranial vessels appear maintained. ORBITS: The visualized portion of the orbits demonstrate no acute abnormality.  SINUSES: The paranasal sinuses are clear. Fluid in the mastoid air cells bilaterally. BONES/SOFT TISSUES: The bone marrow signal intensity appears normal.  The soft tissues demonstrate no acute abnormality. 1. No acute intracranial abnormality. Specifically, no acute infarction. 2. Bilateral mastoid air cell effusions may be congestive or infectious/inflammatory. ASSESSMENT:  Ataxic gait  Depression/anxiety    PLAN:  Neurology consulted  PT/OT  Aspirin, atorvastatin  Continue Lexapro      Diet: DIET GENERAL;  Code Status: Full Code  Surrogate decision maker confirmed with patient:   Extended Emergency Contact Information  Primary Emergency Contact: Bob Baum [place] Phone: [phone]  Mobile Phone: [phone]  Relation: Brother/Sister  Preferred language: English   needed? No  Secondary Emergency Contact: Dennis Clifford  Mobile Phone: [phone]  Relation: Child  Preferred language: English   needed? No      DVT Prophylaxis: []Lovenox []Heparin []PCD [] [address] []Encouraged ambulation  Disposition: []Med/Surg [] Intermediate [] ICU/CCU  Admit status: [] Observation [] Inpatient       NOTE: This report was transcribed using voice recognition software. Every effort was made to ensure accuracy; however, inadvertent computerized transcription errors may be present.

## 2021-01-19 NOTE — PROGRESS NOTES
Physical Therapy    Facility/Department: [facility]  Initial Assessment    NAME: Debi Ennis  : 1965  MRN: [id number]    Date of Service: 2021    Physical therapy orders received/treatment attempted and chart review completed. Patient found in semi Ma's and reported facial/ear pain has resolved. Patient demonstrated ability to perform bed mobility, transfers, and gait in the room independently. Patient demonstrated davenport gait speed, equal stride length with no imbalance, and able to turn around without difficulty during gait assessment. Patient returned self to semi Ma's. Patient reported no gait deviations since IV antibiotic administration. Patient with not PT needs. Thank you for the opportunity to assist in the care of this patient.     Time in: 0950  Time out: 350 [facility], PT, DPT  License [id number]

## 2021-01-21 ENCOUNTER — TELEPHONE (OUTPATIENT)
Dept: ENT CLINIC | Age: 56
End: 2021-01-21

## 2021-01-21 NOTE — TELEPHONE ENCOUNTER
Pt was admitted through the ED on 1/18 for mastoiditis. She is asking if her appt she already has scheduled with Dr Denton Kennedy in February can be moved up. Please review and call pt back to discuss.  Thanks

## 2021-01-22 ENCOUNTER — OFFICE VISIT (OUTPATIENT)
Dept: FAMILY MEDICINE CLINIC | Age: 56
End: 2021-01-22
Payer: COMMERCIAL

## 2021-01-22 VITALS
HEART RATE: 64 BPM | DIASTOLIC BLOOD PRESSURE: 78 MMHG | HEIGHT: 66 IN | WEIGHT: 188.2 LBS | TEMPERATURE: 97.8 F | RESPIRATION RATE: 18 BRPM | SYSTOLIC BLOOD PRESSURE: 110 MMHG | BODY MASS INDEX: 30.25 KG/M2 | OXYGEN SATURATION: 95 %

## 2021-01-22 DIAGNOSIS — H93.13 TINNITUS OF BOTH EARS: ICD-10-CM

## 2021-01-22 DIAGNOSIS — H70.93 MASTOIDITIS OF BOTH SIDES: Primary | ICD-10-CM

## 2021-01-22 PROCEDURE — 99213 OFFICE O/P EST LOW 20 MIN: CPT | Performed by: INTERNAL MEDICINE

## 2021-01-22 PROCEDURE — 1036F TOBACCO NON-USER: CPT | Performed by: INTERNAL MEDICINE

## 2021-01-22 PROCEDURE — G8417 CALC BMI ABV UP PARAM F/U: HCPCS | Performed by: INTERNAL MEDICINE

## 2021-01-22 PROCEDURE — G8427 DOCREV CUR MEDS BY ELIG CLIN: HCPCS | Performed by: INTERNAL MEDICINE

## 2021-01-22 PROCEDURE — 3017F COLORECTAL CA SCREEN DOC REV: CPT | Performed by: INTERNAL MEDICINE

## 2021-01-22 PROCEDURE — G8484 FLU IMMUNIZE NO ADMIN: HCPCS | Performed by: INTERNAL MEDICINE

## 2021-01-22 NOTE — PROGRESS NOTES
Patient:  Debi Ennis  MRN: [id number]  Date of Service: 2021   1965      CHIEF COMPLAINT:    Chief Complaint   Patient presents with    Chills     HA's are better / had 3 Covid tests which were negative    Ear Problem     Ringing continuing        History Obtained From:  patient    HISTORY OF PRESENT ILLNESS:   The patient is a 63 y.o. female with prior history of Ear pain,Mastoiditis and Tinnitus is here for a follow up. Had a Ct scan of brain and MRI scan done. Found to have a bilateral  Mastoiditis. Has upcoming appointment with ENT surgeon in first week of February. No syncopal episodes. Presently on Augmentin twice a day for total duration of 10 days. Ear poain is better. Headaches are much improved. Past medical, surgical and family history reviewed and updated. Medications, allergies, and social history reviewed and updated. ROS:  Negative except for ringing in both ears. Physical Exam:      General appearance: alert, appears stated age and cooperative  Vitals:   Vitals:    21 1344   BP: 110/78   Site: Left Upper Arm   Position: Sitting   Cuff Size: Medium Adult   Pulse: 64   Resp: 18   Temp: 97.8 °F (36.6 °C)   SpO2: 95%   Weight: 188 lb 3.2 oz (85.4 kg)   Height: 5' 6\" (1.676 m)     Skin: Skin color, texture, turgor normal. No rashes or lesions. HEENT: Head: Normocephalic, no lesions, without obvious abnormality. Head: Normal, normocephalic, atraumatic. Eye: Normal external eye, conjunctiva, lids cornea, ROSCOE. Ears: Normal TM's bilaterally. Normal auditory canals and external ears. Non-tender. Nose: Normal external nose, mucus membranes and septum. Neck / Thyroid: Supple, no masses, nodes, nodules or enlargement.   Neck: no adenopathy, no carotid bruit, no JVD, supple, symmetrical, trachea midline and thyroid not enlarged, symmetric, no tenderness/mass/nodules  Lungs: clear to auscultation bilaterally Heart: regular rate and rhythm, S1, S2 normal, no murmur, click, rub or gallop  Abdomen: soft, non-tender; bowel sounds normal; no masses,  no organomegaly  Extremities: extremities normal, atraumatic, no cyanosis or edema  Neurologic: Mental status: Alert, oriented, thought content appropriate    Labs:  CBC:   Lab Results   Component Value Date    WBC 7.8 01/19/2021    RBC 4.20 01/19/2021    HGB 13.1 01/19/2021    HCT 41.1 01/19/2021    MCV 97.9 01/19/2021    MCH 31.2 01/19/2021    MCHC 31.9 01/19/2021    RDW 12.4 01/19/2021     01/19/2021    MPV 10.6 01/19/2021     WBC:    Lab Results   Component Value Date    WBC 7.8 01/19/2021     Platelets:    Lab Results   Component Value Date     01/19/2021     Hemoglobin/Hematocrit:    Lab Results   Component Value Date    HGB 13.1 01/19/2021    HCT 41.1 01/19/2021     CMP:    Lab Results   Component Value Date     01/19/2021    K 3.9 01/19/2021    CL 99 01/19/2021    CO2 29 01/19/2021    BUN 14 01/19/2021    CREATININE 0.8 01/19/2021    GFRAA >60 01/19/2021    LABGLOM >60 01/19/2021    GLUCOSE 83 01/19/2021    PROT 7.7 01/18/2021    LABALBU 4.2 01/18/2021    CALCIUM 9.2 01/19/2021    BILITOT 0.3 01/18/2021    ALKPHOS 73 01/18/2021    AST 21 01/18/2021    ALT 14 01/18/2021     BMP:    Lab Results   Component Value Date     01/19/2021    K 3.9 01/19/2021    CL 99 01/19/2021    CO2 29 01/19/2021    BUN 14 01/19/2021    LABALBU 4.2 01/18/2021    CREATININE 0.8 01/19/2021    CALCIUM 9.2 01/19/2021    GFRAA >60 01/19/2021    LABGLOM >60 01/19/2021    GLUCOSE 83 01/19/2021     Sodium:    Lab Results   Component Value Date     01/19/2021     Potassium:    Lab Results   Component Value Date    K 3.9 01/19/2021     BUN/Creatinine:    Lab Results   Component Value Date    BUN 14 01/19/2021    CREATININE 0.8 01/19/2021     Hepatic Function Panel:    Lab Results   Component Value Date    ALKPHOS 73 01/18/2021    ALT 14 01/18/2021    AST 21 01/18/2021 PROT 7.7 01/18/2021    BILITOT 0.3 01/18/2021    LABALBU 4.2 01/18/2021     Albumin:    Lab Results   Component Value Date    LABALBU 4.2 01/18/2021     Calcium:    Lab Results   Component Value Date    CALCIUM 9.2 01/19/2021     Ionized Calcium:  No results found for: IONCA  Magnesium:  No results found for: MG  Phosphorus:  No results found for: PHOS  LDH:  No results found for: LDH   -----------------------------------------------------------------  EKG: Not indicated today. Assessment and Plan   Yahir Stinson was seen today for chills and ear problem. Diagnoses and all orders for this visit:    Mastoiditis of both sides  -     COMPREHENSIVE METABOLIC PANEL; Future  Pt has upcoming appointment with Dr Emanuel Lindsay ENT surgeon in first week of February. Continue to take Augmentin twice a day and complete 10 days of Augmentin. Tinnitus of both ears  -     CBC Auto Differential; Future  -     MAGNESIUM; Future  Pt advised not to take any aspirin products. Discussed different etiologies of Tinnitus. Dr [facility] ENT surgeon will evaluate patient in first week of February 2021    Labs reviewed with patient. Medications reviewed with patient. All questions answered. Return to clinic in 3 months.     Isabelle Corona  5:13 PM  1/22/2021

## 2021-01-28 ENCOUNTER — HOSPITAL ENCOUNTER (EMERGENCY)
Age: 56
Discharge: HOME | End: 2021-01-28
Attending: EMERGENCY MEDICINE
Payer: COMMERCIAL

## 2021-01-28 VITALS
WEIGHT: 170 LBS | HEIGHT: 66 IN | SYSTOLIC BLOOD PRESSURE: 119 MMHG | BODY MASS INDEX: 27.32 KG/M2 | HEART RATE: 67 BPM | TEMPERATURE: 96.9 F | OXYGEN SATURATION: 96 % | DIASTOLIC BLOOD PRESSURE: 65 MMHG

## 2021-01-28 DIAGNOSIS — R51.9 NONINTRACTABLE HEADACHE, UNSPECIFIED CHRONICITY PATTERN, UNSPECIFIED HEADACHE TYPE: ICD-10-CM

## 2021-01-28 DIAGNOSIS — H92.03 OTALGIA OF BOTH EARS: Primary | ICD-10-CM

## 2021-01-28 DIAGNOSIS — H93.13 TINNITUS OF BOTH EARS: ICD-10-CM

## 2021-01-28 LAB
ALBUMIN SERPL-MCNC: 4.8 G/DL (ref 3.5–5.2)
ALP BLD-CCNC: 63 U/L (ref 35–104)
ALT SERPL-CCNC: 13 U/L (ref 0–32)
ANION GAP SERPL CALCULATED.3IONS-SCNC: 13 MMOL/L (ref 7–16)
AST SERPL-CCNC: 20 U/L (ref 0–31)
BASOPHILS ABSOLUTE: 0.08 E9/L (ref 0–0.2)
BASOPHILS RELATIVE PERCENT: 1 % (ref 0–2)
BILIRUB SERPL-MCNC: 0.2 MG/DL (ref 0–1.2)
BUN BLDV-MCNC: 24 MG/DL (ref 6–20)
CALCIUM SERPL-MCNC: 10 MG/DL (ref 8.6–10.2)
CHLORIDE BLD-SCNC: 101 MMOL/L (ref 98–107)
CO2: 23 MMOL/L (ref 22–29)
CREAT SERPL-MCNC: 1 MG/DL (ref 0.5–1)
EOSINOPHILS ABSOLUTE: 0.14 E9/L (ref 0.05–0.5)
EOSINOPHILS RELATIVE PERCENT: 1.8 % (ref 0–6)
GFR AFRICAN AMERICAN: >60
GFR NON-AFRICAN AMERICAN: 57 ML/MIN/1.73
GLUCOSE BLD-MCNC: 106 MG/DL (ref 74–99)
HCT VFR BLD CALC: 41.7 % (ref 34–48)
HEMOGLOBIN: 14.2 G/DL (ref 11.5–15.5)
IMMATURE GRANULOCYTES #: 0.03 E9/L
IMMATURE GRANULOCYTES %: 0.4 % (ref 0–5)
LACTIC ACID: 1.5 MMOL/L (ref 0.5–2.2)
LYMPHOCYTES ABSOLUTE: 2.48 E9/L (ref 1.5–4)
LYMPHOCYTES RELATIVE PERCENT: 32 % (ref 20–42)
MAGNESIUM: 1.9 MG/DL (ref 1.6–2.6)
MCH RBC QN AUTO: 32 PG (ref 26–35)
MCHC RBC AUTO-ENTMCNC: 34.1 % (ref 32–34.5)
MCV RBC AUTO: 93.9 FL (ref 80–99.9)
MONOCYTES ABSOLUTE: 0.63 E9/L (ref 0.1–0.95)
MONOCYTES RELATIVE PERCENT: 8.1 % (ref 2–12)
NEUTROPHILS ABSOLUTE: 4.38 E9/L (ref 1.8–7.3)
NEUTROPHILS RELATIVE PERCENT: 56.7 % (ref 43–80)
PDW BLD-RTO: 12.1 FL (ref 11.5–15)
PLATELET # BLD: 289 E9/L (ref 130–450)
PMV BLD AUTO: 10.8 FL (ref 7–12)
POTASSIUM REFLEX MAGNESIUM: 4.4 MMOL/L (ref 3.5–5)
RBC # BLD: 4.44 E12/L (ref 3.5–5.5)
SODIUM BLD-SCNC: 137 MMOL/L (ref 132–146)
TOTAL PROTEIN: 7.7 G/DL (ref 6.4–8.3)
WBC # BLD: 7.7 E9/L (ref 4.5–11.5)

## 2021-01-28 PROCEDURE — 83735 ASSAY OF MAGNESIUM: CPT

## 2021-01-28 PROCEDURE — 80053 COMPREHEN METABOLIC PANEL: CPT

## 2021-01-28 PROCEDURE — 99282 EMERGENCY DEPT VISIT SF MDM: CPT

## 2021-01-28 PROCEDURE — 83605 ASSAY OF LACTIC ACID: CPT

## 2021-01-28 PROCEDURE — 85025 COMPLETE CBC W/AUTO DIFF WBC: CPT

## 2021-01-28 RX ORDER — DEXAMETHASONE SODIUM PHOSPHATE 10 MG/ML
8 INJECTION INTRAMUSCULAR; INTRAVENOUS ONCE
Status: DISCONTINUED | OUTPATIENT
Start: 2021-01-28 | End: 2021-01-28 | Stop reason: HOSPADM

## 2021-01-28 RX ORDER — DEXAMETHASONE SODIUM PHOSPHATE 10 MG/ML
INJECTION, SOLUTION INTRAMUSCULAR; INTRAVENOUS
Status: DISCONTINUED
Start: 2021-01-28 | End: 2021-01-28 | Stop reason: HOSPADM

## 2021-01-29 NOTE — ED NOTES
FIRST PROVIDER CONTACT ASSESSMENT NOTE      Department of Emergency Medicine   ED  First Provider Note   1/28/21  7:34 PM EST    Chief Complaint: Tinnitus (pain/ringing both ears) and Sweats      History of Present Illness:    Juan Miguel Palemr is a 63 y.o. female who presents to the ED by private car for history of mastoiditis took 10-day course of Augmentin symptoms persist intermittent chills, tinnitus. Patient initially received IV antibiotics and she started to feel better. No change while on the Augmentin  Focused Screening Exam:  Constitutional:  Alert, appears stated age and is in no distress.   Heart regular rate and rhythm  Lungs clear    *ALLERGIES*     Sulfa antibiotics     ED Triage Vitals   BP Temp Temp Source Pulse Resp SpO2 Height Weight   01/28/21 1933 01/28/21 1926 01/28/21 1926 01/28/21 1926 -- 01/28/21 1926 01/28/21 1933 01/28/21 1933   119/65 96.9 °F (36.1 °C) Infrared 74  96 % 5' 6\" (1.676 m) 170 lb (77.1 kg)        Initial Plan of Care:  Initiate Treatment-Testing, Proceed toTreatment Area When Bed Available for ED Attending/MLP to Continue Care    -----------------[address] ASSESSMENT NOTE--------------  Electronically signed by Lara Dandy, PA   DD: 1/28/21     Lara Dandy, PA  01/28/21 1936

## 2021-01-29 NOTE — ED PROVIDER NOTES
GFR Non-African American 57 >=60 mL/min/1.73    GFR African American >60     Calcium 10.0 8.6 - 10.2 mg/dL    Total Protein 7.7 6.4 - 8.3 g/dL    Albumin 4.8 3.5 - 5.2 g/dL    Total Bilirubin 0.2 0.0 - 1.2 mg/dL    Alkaline Phosphatase 63 35 - 104 U/L    ALT 13 0 - 32 U/L    AST 20 0 - 31 U/L   Lactic Acid, Plasma   Result Value Ref Range    Lactic Acid 1.5 0.5 - 2.2 mmol/L       RADIOLOGY:  Interpreted by Radiologist.  No orders to display       ------------------------- NURSING NOTES AND VITALS REVIEWED ---------------------------   The nursing notes within the ED encounter and vital signs as below have been reviewed. /65   Pulse 67   Temp 96.9 °F (36.1 °C) (Infrared)   Ht 5' 6\" (1.676 m)   Wt 170 lb (77.1 kg)   SpO2 96%   BMI 27.44 kg/m²   Oxygen Saturation Interpretation: Normal      ---------------------------------------------------PHYSICAL EXAM--------------------------------------      Constitutional/General: Alert and oriented x3, well appearing, non toxic in NAD  Head: Normocephalic and atraumatic  Eyes: PERRL, EOMI  Ears TM without erythema no inflammation of the canals. There is no tenderness over the mastoid. No swelling present  Mouth: Oropharynx clear, handling secretions, no trismus there is no erythema the pharynx no tonsillar swelling or exudate uvula is midline  Neck: Supple, full ROM, no cervical adenopathy appreciated  Pulmonary: Lungs clear to auscultation bilaterally, no wheezes, rales, or rhonchi. Not in respiratory distress  Cardiovascular:  Regular rate and rhythm, no murmurs, gallops, or rubs. 2+ distal pulses  Abdomen: Soft, non tender, non distended,   Extremities: Moves all extremities x 4.  Warm and well perfused  Skin: warm and dry without rash  Neurologic: GCS 15,  Psych: Normal Affect      ------------------------------ ED COURSE/MEDICAL DECISION MAKING----------------------  Medications   dexamethasone (DECADRON) injection 8 mg (8 mg Intramuscular Not Given 1/28/21 2059)         ED COURSE:       Medical Decision Making:    Patient recently had an MRI showing incidental finding of mastoid air effusions bilaterally. She was treated empirically for possible mastoiditis. Patient has normal white cell count. No fever. No tenderness over the mastoid bilaterally. She was recently treated for URI continues to have some sinus congestion and feeling of fluid in the right ear. There was no finding of otitis media or otitis externa. She is presently using Flonase. She was offered a dose of Decadron, she declined. Patient is to follow-up outpatient with ENT as previously scheduled as well as her primary care continue with Flonase. She does not appear to have a mastoiditis no additional antibiotics ordered    Counseling: The emergency provider has spoken with the patient and discussed todays results, in addition to providing specific details for the plan of care and counseling regarding the diagnosis and prognosis. Questions are answered at this time and they are agreeable with the plan.      --------------------------------- IMPRESSION AND DISPOSITION ---------------------------------    IMPRESSION  1. Otalgia of both ears    2. Tinnitus of both ears    3. Nonintractable headache, unspecified chronicity pattern, unspecified headache type        DISPOSITION  Disposition: Discharge to home  Patient condition is good      NOTE: This report was transcribed using voice recognition software.  Every effort was made to ensure accuracy; however, inadvertent computerized transcription errors may be present     Katherine Almeida, [id number]  01/28/21 0013

## 2021-02-03 ENCOUNTER — OFFICE VISIT (OUTPATIENT)
Dept: ENT CLINIC | Age: 56
End: 2021-02-03
Payer: COMMERCIAL

## 2021-02-03 VITALS
DIASTOLIC BLOOD PRESSURE: 60 MMHG | HEART RATE: 60 BPM | RESPIRATION RATE: 16 BRPM | TEMPERATURE: 97.4 F | SYSTOLIC BLOOD PRESSURE: 124 MMHG | OXYGEN SATURATION: 98 %

## 2021-02-03 DIAGNOSIS — J30.89 SEASONAL ALLERGIC RHINITIS DUE TO OTHER ALLERGIC TRIGGER: Primary | ICD-10-CM

## 2021-02-03 DIAGNOSIS — H92.03 EAR PAIN, BILATERAL: ICD-10-CM

## 2021-02-03 PROCEDURE — 3017F COLORECTAL CA SCREEN DOC REV: CPT | Performed by: OTOLARYNGOLOGY

## 2021-02-03 PROCEDURE — G8427 DOCREV CUR MEDS BY ELIG CLIN: HCPCS | Performed by: OTOLARYNGOLOGY

## 2021-02-03 PROCEDURE — G8484 FLU IMMUNIZE NO ADMIN: HCPCS | Performed by: OTOLARYNGOLOGY

## 2021-02-03 PROCEDURE — 1036F TOBACCO NON-USER: CPT | Performed by: OTOLARYNGOLOGY

## 2021-02-03 PROCEDURE — 99213 OFFICE O/P EST LOW 20 MIN: CPT | Performed by: OTOLARYNGOLOGY

## 2021-02-03 PROCEDURE — G8417 CALC BMI ABV UP PARAM F/U: HCPCS | Performed by: OTOLARYNGOLOGY

## 2021-02-03 ASSESSMENT — ENCOUNTER SYMPTOMS
SINUS PAIN: 0
NAUSEA: 0
SORE THROAT: 0
SINUS PRESSURE: 0
VOMITING: 0
SHORTNESS OF BREATH: 0
ABDOMINAL PAIN: 0
CHEST TIGHTNESS: 0
PHOTOPHOBIA: 0
ABDOMINAL DISTENTION: 0
DIARRHEA: 0
FACIAL SWELLING: 0
EYE REDNESS: 0
EYE PAIN: 0

## 2021-02-03 NOTE — PROGRESS NOTES
Subjective:      Patient ID:  Dominiuqe Bryan is a 63 y.o. female. HPI:    Patient presents today for ear pain. Condition has been present for 1 month(s). Pt is doing ok today.         Past Medical History:   Diagnosis Date    Abdominal cramping     Anxiety     Asthma     controlled per patient    Constipation     Delivery normal     x2    Depression     Left arm pain     had cardiac work-up 2012? 3/16/15 -resolved    SOB (shortness of breath)     resolved     Past Surgical History:   Procedure Laterality Date    BREAST SURGERY Right 1980    cyst    COLONOSCOPY  03/2015    Dr. Roxi Min, repeat in 8 years    CYST REMOVAL      tailbone, face    FACIAL COSMETIC SURGERY      TUBAL LIGATION  1995     Family History   Problem Relation Age of Onset    Heart Attack Father     Coronary Art Dis Father     Breast Cancer Paternal Grandmother      Social History     Socioeconomic History    Marital status: Single     Spouse name: None    Number of children: None    Years of education: None    Highest education level: None   Occupational History    None   Social Needs    Financial resource strain: None    Food insecurity     Worry: None     Inability: None    Transportation needs     Medical: None     Non-medical: None   Tobacco Use    Smoking status: Never Smoker    Smokeless tobacco: Never Used   Substance and Sexual Activity    Alcohol use: Not Currently     Alcohol/week: 0.0 standard drinks     Comment: Social use    Drug use: No    Sexual activity: Yes     Partners: Male     Birth control/protection: Surgical     Comment: btl   Lifestyle    Physical activity     Days per week: None     Minutes per session: None    Stress: None   Relationships    Social connections     Talks on phone: None     Gets together: None     Attends [employer] witness service: None     Active member of club or organization: None     Attends meetings of clubs or organizations: None     Relationship status: None  Intimate partner violence     Fear of current or ex partner: None     Emotionally abused: None     Physically abused: None     Forced sexual activity: None   Other Topics Concern    None   Social History Narrative    None     Allergies   Allergen Reactions    Sulfa Antibiotics Swelling    Vancomycin Itching       Review of Systems   Constitutional: Positive for chills. Negative for fever. HENT: Positive for ear pain. Negative for congestion, facial swelling, hearing loss, sinus pressure, sinus pain, sore throat and tinnitus. Eyes: Positive for visual disturbance. Negative for photophobia, pain and redness. Respiratory: Negative for chest tightness and shortness of breath. Cardiovascular: Negative for chest pain and palpitations. Gastrointestinal: Negative for abdominal distention, abdominal pain, diarrhea, nausea and vomiting. Endocrine: Negative for cold intolerance and heat intolerance. Skin: Negative for pallor and rash. Neurological: Positive for dizziness and light-headedness. Negative for weakness and headaches. Psychiatric/Behavioral: Negative for agitation and behavioral problems. Objective:     Vitals:    02/03/21 1557   BP: 124/60   Pulse: 60   Resp: 16   Temp: 97.4 °F (36.3 °C)   SpO2: 98%     Physical Exam  Constitutional:       Appearance: She is well-developed. HENT:      Head: Normocephalic and atraumatic. Right Ear: Tympanic membrane, ear canal and external ear normal.      Left Ear: Tympanic membrane, ear canal and external ear normal.      Ears:      Comments: Some old dry blood on TM, minimal. No lesions noted. Nose: Nose normal.      Mouth/Throat:      Pharynx: Uvula midline. Eyes:      Pupils: Pupils are equal, round, and reactive to light. Neck:      Musculoskeletal: Normal range of motion and neck supple. Cardiovascular:      Rate and Rhythm: Normal rate. Heart sounds: Normal heart sounds.    Pulmonary: Effort: Pulmonary effort is normal. No respiratory distress. Abdominal:      General: There is no distension. Palpations: Abdomen is soft. Skin:     General: Skin is warm and dry. Neurological:      Mental Status: She is alert and oriented to person, place, and time. Psychiatric:         Behavior: Behavior normal.                 Assessment:       Diagnosis Orders   1. Seasonal allergic rhinitis due to other allergic trigger     2. Ear pain, bilateral                Plan:      Allergic rhinitis  I do want to continue fluticasone (Flonase)   for now. Call or return to clinic prn if these symptoms worsen or fail to improve as anticipated.         Follow up in 4 month(s) normal

## 2021-03-04 RX ORDER — LINACLOTIDE 290 UG/1
CAPSULE, GELATIN COATED ORAL
Qty: 90 CAPSULE | Refills: 1 | Status: SHIPPED
Start: 2021-03-04 | End: 2022-06-17

## 2021-03-31 ENCOUNTER — TELEPHONE (OUTPATIENT)
Dept: FAMILY MEDICINE CLINIC | Age: 56
End: 2021-03-31

## 2021-03-31 DIAGNOSIS — E27.9 ADRENAL ABNORMALITY (HCC): Primary | ICD-10-CM

## 2021-03-31 NOTE — TELEPHONE ENCOUNTER
Pt called wanting referral for adrenal insufficiency for Dr. Obi Nieto, endocrinologist.  Her cortisol level has been off and she has been gaining weight even though she has been dieting and exercising.   Please advise

## 2021-05-14 ENCOUNTER — HOSPITAL ENCOUNTER (OUTPATIENT)
Age: 56
Discharge: HOME | End: 2021-05-14
Payer: COMMERCIAL

## 2021-05-14 LAB
T4 FREE: 1.09 NG/DL (ref 0.93–1.7)
TSH SERPL DL<=0.05 MIU/L-ACNC: 2.06 UIU/ML (ref 0.27–4.2)

## 2021-05-14 PROCEDURE — 84439 ASSAY OF FREE THYROXINE: CPT

## 2021-05-14 PROCEDURE — 84443 ASSAY THYROID STIM HORMONE: CPT

## 2021-05-14 PROCEDURE — 36415 COLL VENOUS BLD VENIPUNCTURE: CPT

## 2021-05-27 DIAGNOSIS — R45.86 MOOD SWINGS: ICD-10-CM

## 2021-05-27 DIAGNOSIS — Z79.890 ON HORMONE REPLACEMENT THERAPY: ICD-10-CM

## 2021-05-27 DIAGNOSIS — R53.83 OTHER FATIGUE: ICD-10-CM

## 2021-05-27 DIAGNOSIS — R63.5 WEIGHT GAIN: ICD-10-CM

## 2021-05-27 LAB
CORTISOL TOTAL: 6.16 MCG/DL (ref 2.68–18.4)
ESTRADIOL LEVEL: 46.5 PG/ML
HBA1C MFR BLD: 5.1 % (ref 4–5.6)

## 2021-05-28 LAB — VITAMIN D 25-HYDROXY: 62 NG/ML (ref 30–100)

## 2021-05-29 LAB
PROGESTERONE LEVEL: 0.44 NG/ML
SEX HORMONE BINDING GLOBULIN: 60 NMOL/L (ref 30–135)
TESTOSTERONE FREE-NONMALE: 5.1 PG/ML (ref 0.6–3.8)
TESTOSTERONE TOTAL: 42 NG/DL (ref 20–70)

## 2021-06-03 LAB — DHEAS (DHEA SULFATE): 56 UG/DL (ref 26–200)

## 2021-06-05 LAB — PREGNENOLONE: 64 NG/DL (ref 15–132)

## 2021-06-25 ENCOUNTER — HOSPITAL ENCOUNTER (OUTPATIENT)
Dept: GENERAL RADIOLOGY | Age: 56
Discharge: HOME | End: 2021-06-27
Payer: COMMERCIAL

## 2021-06-25 DIAGNOSIS — Z12.31 ENCOUNTER FOR SCREENING MAMMOGRAM FOR BREAST CANCER: ICD-10-CM

## 2021-06-25 PROCEDURE — 77063 BREAST TOMOSYNTHESIS BI: CPT

## 2021-07-16 ENCOUNTER — TELEPHONE (OUTPATIENT)
Dept: ADMINISTRATIVE | Age: 56
End: 2021-07-16

## 2021-07-16 ENCOUNTER — TELEPHONE (OUTPATIENT)
Dept: ENT CLINIC | Age: 56
End: 2021-07-16

## 2021-07-16 NOTE — TELEPHONE ENCOUNTER
Pt called in wanting to  Be seen by Dr Chase Jolley today. She is experiencing dizziness-frontal headache-and a feeling of fluid in her ears. She said these symptoms had once landed her in the hospital and Dr Chase Jolley said if they come back to call in. She is scheduled for the first opening in Sept, but is hoping to get that moved up.

## 2021-11-12 DIAGNOSIS — J30.89 SEASONAL ALLERGIC RHINITIS DUE TO OTHER ALLERGIC TRIGGER: Primary | ICD-10-CM

## 2021-11-12 RX ORDER — FLUTICASONE PROPIONATE 50 MCG
SPRAY, SUSPENSION (ML) NASAL
Qty: 16 G | Refills: 3 | Status: SHIPPED
Start: 2021-11-12 | End: 2022-08-31 | Stop reason: SDUPTHER

## 2021-11-12 NOTE — TELEPHONE ENCOUNTER
Patient was last seen in office 2/3/2021 patient would like a prescription refill for Flonase nasal spray.

## 2021-11-16 ENCOUNTER — HOSPITAL ENCOUNTER (OUTPATIENT)
Age: 56
Discharge: HOME | End: 2021-11-16
Payer: COMMERCIAL

## 2021-11-16 LAB — MISC CHEMISTRY: NORMAL

## 2021-11-16 PROCEDURE — 86800 THYROGLOBULIN ANTIBODY: CPT

## 2021-11-16 PROCEDURE — 86376 MICROSOMAL ANTIBODY EACH: CPT

## 2021-11-16 PROCEDURE — 86147 CARDIOLIPIN ANTIBODY EA IG: CPT

## 2021-11-19 LAB
THYROGLOBULIN ANTIBODY: 16 IU/ML (ref 0–40)
THYROID PEROXIDASE (TPO) ABS: <4 IU/ML (ref 0–25)

## 2021-11-30 ENCOUNTER — HOSPITAL ENCOUNTER (OUTPATIENT)
Age: 56
Discharge: HOME | End: 2021-11-30
Payer: COMMERCIAL

## 2021-11-30 PROCEDURE — 86628 CANDIDA ANTIBODY: CPT

## 2021-12-03 LAB
ANTICARDIOLIPIN IGA ANTIBODY: NORMAL APL
ANTICARDIOLIPIN IGG ANTIBODY: NORMAL GPL
CARDIOLIPIN AB IGM: NORMAL MPL

## 2021-12-09 LAB
Lab: NORMAL
REPORT: NORMAL
THIS TEST SENT TO: NORMAL

## 2021-12-17 NOTE — PROGRESS NOTES
# Gevena Reaper is non-formulary and will not be dispensed by the pharmacy at this time. Please have patient bring in home supply of medication and deliver it to pharmacy for identification and barcode. If patient has not supplied medication by 1400 on 01/20/21, please notify pharmacy.  # Body Location Override (Optional - Billing Will Still Be Based On Selected Body Map Location If Applicable): left mid pretibial region Add 25770 Cpt? (Important Note: In 2017 The Use Of 87372 Is Being Tracked By Cms To Determine Future Global Period Reimbursement For Global Periods): no Detail Level: Detailed

## 2022-01-09 ENCOUNTER — HOSPITAL ENCOUNTER (EMERGENCY)
Age: 57
Discharge: HOME | End: 2022-01-09
Payer: COMMERCIAL

## 2022-01-09 VITALS
SYSTOLIC BLOOD PRESSURE: 119 MMHG | HEART RATE: 76 BPM | DIASTOLIC BLOOD PRESSURE: 57 MMHG | OXYGEN SATURATION: 99 % | RESPIRATION RATE: 16 BRPM | TEMPERATURE: 97.5 F

## 2022-01-09 DIAGNOSIS — S61.512A LACERATION OF LEFT WRIST, INITIAL ENCOUNTER: Primary | ICD-10-CM

## 2022-01-09 PROCEDURE — 99284 EMERGENCY DEPT VISIT MOD MDM: CPT

## 2022-01-09 PROCEDURE — 6360000002 HC RX W HCPCS: Performed by: PHYSICIAN ASSISTANT

## 2022-01-09 PROCEDURE — 90471 IMMUNIZATION ADMIN: CPT | Performed by: PHYSICIAN ASSISTANT

## 2022-01-09 PROCEDURE — 6370000000 HC RX 637 (ALT 250 FOR IP): Performed by: PHYSICIAN ASSISTANT

## 2022-01-09 PROCEDURE — 90714 TD VACC NO PRESV 7 YRS+ IM: CPT | Performed by: PHYSICIAN ASSISTANT

## 2022-01-09 PROCEDURE — 12001 RPR S/N/AX/GEN/TRNK 2.5CM/<: CPT

## 2022-01-09 RX ORDER — GINSENG 100 MG
CAPSULE ORAL ONCE
Status: DISCONTINUED | OUTPATIENT
Start: 2022-01-09 | End: 2022-01-09

## 2022-01-09 RX ORDER — LIDOCAINE HYDROCHLORIDE 10 MG/ML
20 INJECTION, SOLUTION INFILTRATION; PERINEURAL ONCE
Status: DISCONTINUED | OUTPATIENT
Start: 2022-01-09 | End: 2022-01-09

## 2022-01-09 RX ADMIN — CLOSTRIDIUM TETANI TOXOID ANTIGEN (FORMALDEHYDE INACTIVATED) AND CORYNEBACTERIUM DIPHTHERIAE TOXOID ANTIGEN (FORMALDEHYDE INACTIVATED) 0.5 ML: 5; 2 INJECTION, SUSPENSION INTRAMUSCULAR at 21:48

## 2022-01-09 RX ADMIN — Medication: at 21:48

## 2022-01-10 NOTE — ED PROVIDER NOTES
[facility]  HPI:  1/9/22, Time: 9:29 PM YOLETTE Villaseñor is a 61 y.o. female presenting to the ED for left wrist laceration, beginning 2 hours  ago. The complaint has been persistent, mild in severity, and worsened by nothing. patient comes in with complaint of laceration to her left wrist.  She is moving with cutting and boxes and accidentally cut her left wrist.  Tetanus is not up-to-date. She has full range of motion denies any numbness or tingling of the extremity. Patient's been taking Motrin for sinus pain    Review of Systems:   A complete review of systems was performed and pertinent positives and negatives are stated within HPI, all other systems reviewed and are negative.          --------------------------------------------- PAST HISTORY ---------------------------------------------  Past Medical History:  has a past medical history of Abdominal cramping, Anxiety, Asthma, Constipation, Delivery normal, Depression, Left arm pain, and SOB (shortness of breath). Past Surgical History:  has a past surgical history that includes cyst removal; Breast surgery (Right, 1980); Tubal ligation (1995); Colonoscopy (03/2015); Facial cosmetic surgery; and Breast biopsy (Right). Social History:  reports that she has never smoked. She has never used smokeless tobacco. She reports previous alcohol use. She reports that she does not use drugs. Family History: family history includes Breast Cancer in her paternal grandmother; Coronary Art Dis in her father; Heart Attack in her father. The patients home medications have been reviewed. Allergies: Sulfa antibiotics and Vancomycin    -------------------------------------------------- RESULTS -------------------------------------------------  All laboratory and radiology results have been personally reviewed by myself   LABS:  No results found for this visit on 01/09/22.     RADIOLOGY:  Interpreted by Radiologist.  No orders to display ------------------------- NURSING NOTES AND VITALS REVIEWED ---------------------------   The nursing notes within the ED encounter and vital signs as below have been reviewed. BP (!) 119/57   Pulse 76   Temp 97.5 °F (36.4 °C)   Resp 16   LMP 05/16/2020   SpO2 99%   Oxygen Saturation Interpretation: Normal      ---------------------------------------------------PHYSICAL EXAM--------------------------------------      Constitutional/General: Alert and oriented x3, well appearing, non toxic in NAD  Head: Normocephalic and atraumatic  Eyes: PERRL, EOMI  Mouth: Oropharynx clear, handling secretions, no trismus  Neck: Supple, full ROM,   Pulmonary: Lungs clear to auscultation bilaterally, no wheezes, rales, or rhonchi. Not in respiratory distress  Cardiovascular:  Regular rate and rhythm, no murmurs, gallops, or rubs. 2+ distal pulses  Abdomen: Soft, non tender, non distended,   Extremities: Moves all extremities x 4. Warm and well perfused left wrist with 0.25 cm laceration bleeding is controlled full range of motion of the wrist present normal sensation  Skin: warm and dry without rash  Neurologic: GCS 15,  Psych: Normal Affect      ------------------------------ ED COURSE/MEDICAL DECISION MAKING----------------------  Medications   tetanus & diphtheria toxoids (adult) 5-2 LFU injection 0.5 mL (0.5 mLs IntraMUSCular Given 1/9/22 2148)   topical skin adhesive stick ( Topical Given by Other 1/9/22 2148)         ED COURSE:      PROCEDURE NOTE  1/9/22       Time: 2145    LACERATION REPAIR  Risks, benefits and alternatives (for applicable procedures below) described. Performed By: RACHANA Gonzalez. Informed consent: Verbal consent obtained. The patient was counseled regarding the procedure in person, it's indications, risks, potential complications and alternatives and any questions were answered. Verbal consent was obtained. Laceration #: 1. Location: left wrist   Length: .25 cm.   The wound area was irrigated with sterile saline, cleansed with povidone iodine and draped in a sterile fashion. Local Anesthesia:  not required/indicated. The wound was explored with the following results: Thickness: superficial. no foreign body or tendon injury seen. Debridement: None. Undermining: None. Wound Margins Revised: yes. Flaps Aligned: no. The wound was closed Dermabond/tissue adhesive and #1 steri strip(s). Dressing:  a sterile dressing. There were no additional wounds requiring formal closure. Medical Decision Making:    Patient came in with complaint of laceration to the left wrist.  Was superficial bleeding had been controlled closed with Dermabond and Steri-Strips. Follow-up primary care 1 to 2 days as needed    Counseling: The emergency provider has spoken with the patient and discussed todays results, in addition to providing specific details for the plan of care and counseling regarding the diagnosis and prognosis. Questions are answered at this time and they are agreeable with the plan.      --------------------------------- IMPRESSION AND DISPOSITION ---------------------------------    IMPRESSION  1. Laceration of left wrist, initial encounter        DISPOSITION  Disposition: Discharge to home  Patient condition is good      NOTE: This report was transcribed using voice recognition software.  Every effort was made to ensure accuracy; however, inadvertent computerized transcription errors may be present     Wanda Gonzalez  01/09/22 2157

## 2022-08-31 ENCOUNTER — OFFICE VISIT (OUTPATIENT)
Dept: FAMILY MEDICINE CLINIC | Age: 57
End: 2022-08-31
Payer: COMMERCIAL

## 2022-08-31 VITALS
BODY MASS INDEX: 33.11 KG/M2 | HEART RATE: 76 BPM | RESPIRATION RATE: 16 BRPM | WEIGHT: 206 LBS | DIASTOLIC BLOOD PRESSURE: 62 MMHG | HEIGHT: 66 IN | OXYGEN SATURATION: 96 % | SYSTOLIC BLOOD PRESSURE: 114 MMHG | TEMPERATURE: 97 F

## 2022-08-31 DIAGNOSIS — J30.89 SEASONAL ALLERGIC RHINITIS DUE TO OTHER ALLERGIC TRIGGER: ICD-10-CM

## 2022-08-31 DIAGNOSIS — Z01.818 PRE-OP EXAM: Primary | ICD-10-CM

## 2022-08-31 DIAGNOSIS — Z01.818 PRE-OP EXAM: ICD-10-CM

## 2022-08-31 DIAGNOSIS — E78.5 HYPERLIPIDEMIA, UNSPECIFIED HYPERLIPIDEMIA TYPE: ICD-10-CM

## 2022-08-31 LAB
ALBUMIN SERPL-MCNC: 5 G/DL (ref 3.5–5.2)
ALP BLD-CCNC: 97 U/L (ref 35–104)
ALT SERPL-CCNC: 19 U/L (ref 0–32)
ANION GAP SERPL CALCULATED.3IONS-SCNC: 14 MMOL/L (ref 7–16)
AST SERPL-CCNC: 30 U/L (ref 0–31)
BASOPHILS ABSOLUTE: 0.1 E9/L (ref 0–0.2)
BASOPHILS RELATIVE PERCENT: 1.4 % (ref 0–2)
BILIRUB SERPL-MCNC: 0.4 MG/DL (ref 0–1.2)
BUN BLDV-MCNC: 16 MG/DL (ref 6–20)
CALCIUM SERPL-MCNC: 10.2 MG/DL (ref 8.6–10.2)
CHLORIDE BLD-SCNC: 102 MMOL/L (ref 98–107)
CHOLESTEROL, TOTAL: 264 MG/DL (ref 0–199)
CO2: 26 MMOL/L (ref 22–29)
CREAT SERPL-MCNC: 1 MG/DL (ref 0.5–1)
EOSINOPHILS ABSOLUTE: 0.25 E9/L (ref 0.05–0.5)
EOSINOPHILS RELATIVE PERCENT: 3.5 % (ref 0–6)
GFR AFRICAN AMERICAN: >60
GFR NON-AFRICAN AMERICAN: 57 ML/MIN/1.73
GLUCOSE BLD-MCNC: 80 MG/DL (ref 74–99)
HCT VFR BLD CALC: 43.8 % (ref 34–48)
HDLC SERPL-MCNC: 65 MG/DL
HEMOGLOBIN: 13.9 G/DL (ref 11.5–15.5)
IMMATURE GRANULOCYTES #: 0.02 E9/L
IMMATURE GRANULOCYTES %: 0.3 % (ref 0–5)
LDL CHOLESTEROL CALCULATED: 169 MG/DL (ref 0–99)
LYMPHOCYTES ABSOLUTE: 2.29 E9/L (ref 1.5–4)
LYMPHOCYTES RELATIVE PERCENT: 32 % (ref 20–42)
MCH RBC QN AUTO: 31.7 PG (ref 26–35)
MCHC RBC AUTO-ENTMCNC: 31.7 % (ref 32–34.5)
MCV RBC AUTO: 99.8 FL (ref 80–99.9)
MONOCYTES ABSOLUTE: 0.65 E9/L (ref 0.1–0.95)
MONOCYTES RELATIVE PERCENT: 9.1 % (ref 2–12)
NEUTROPHILS ABSOLUTE: 3.84 E9/L (ref 1.8–7.3)
NEUTROPHILS RELATIVE PERCENT: 53.7 % (ref 43–80)
PDW BLD-RTO: 12.9 FL (ref 11.5–15)
PLATELET # BLD: 268 E9/L (ref 130–450)
PMV BLD AUTO: 10.9 FL (ref 7–12)
POTASSIUM SERPL-SCNC: 4.5 MMOL/L (ref 3.5–5)
RBC # BLD: 4.39 E12/L (ref 3.5–5.5)
SODIUM BLD-SCNC: 142 MMOL/L (ref 132–146)
TOTAL PROTEIN: 7.7 G/DL (ref 6.4–8.3)
TRIGL SERPL-MCNC: 149 MG/DL (ref 0–149)
VLDLC SERPL CALC-MCNC: 30 MG/DL
WBC # BLD: 7.2 E9/L (ref 4.5–11.5)

## 2022-08-31 PROCEDURE — 1036F TOBACCO NON-USER: CPT | Performed by: INTERNAL MEDICINE

## 2022-08-31 PROCEDURE — G8427 DOCREV CUR MEDS BY ELIG CLIN: HCPCS | Performed by: INTERNAL MEDICINE

## 2022-08-31 PROCEDURE — 93000 ELECTROCARDIOGRAM COMPLETE: CPT | Performed by: INTERNAL MEDICINE

## 2022-08-31 PROCEDURE — 99214 OFFICE O/P EST MOD 30 MIN: CPT | Performed by: INTERNAL MEDICINE

## 2022-08-31 PROCEDURE — G8417 CALC BMI ABV UP PARAM F/U: HCPCS | Performed by: INTERNAL MEDICINE

## 2022-08-31 PROCEDURE — 3017F COLORECTAL CA SCREEN DOC REV: CPT | Performed by: INTERNAL MEDICINE

## 2022-08-31 RX ORDER — FLUTICASONE PROPIONATE 50 MCG
SPRAY, SUSPENSION (ML) NASAL
Qty: 16 G | Refills: 3 | Status: SHIPPED | OUTPATIENT
Start: 2022-08-31

## 2022-08-31 RX ORDER — ALBUTEROL SULFATE 90 UG/1
1 AEROSOL, METERED RESPIRATORY (INHALATION) EVERY 6 HOURS PRN
Qty: 2 EACH | Refills: 3 | Status: SHIPPED | OUTPATIENT
Start: 2022-08-31

## 2022-08-31 SDOH — ECONOMIC STABILITY: FOOD INSECURITY: WITHIN THE PAST 12 MONTHS, YOU WORRIED THAT YOUR FOOD WOULD RUN OUT BEFORE YOU GOT MONEY TO BUY MORE.: NEVER TRUE

## 2022-08-31 SDOH — ECONOMIC STABILITY: FOOD INSECURITY: WITHIN THE PAST 12 MONTHS, THE FOOD YOU BOUGHT JUST DIDN'T LAST AND YOU DIDN'T HAVE MONEY TO GET MORE.: NEVER TRUE

## 2022-08-31 ASSESSMENT — PATIENT HEALTH QUESTIONNAIRE - PHQ9
SUM OF ALL RESPONSES TO PHQ QUESTIONS 1-9: 5
SUM OF ALL RESPONSES TO PHQ QUESTIONS 1-9: 5
10. IF YOU CHECKED OFF ANY PROBLEMS, HOW DIFFICULT HAVE THESE PROBLEMS MADE IT FOR YOU TO DO YOUR WORK, TAKE CARE OF THINGS AT HOME, OR GET ALONG WITH OTHER PEOPLE: 3
3. TROUBLE FALLING OR STAYING ASLEEP: 1
9. THOUGHTS THAT YOU WOULD BE BETTER OFF DEAD, OR OF HURTING YOURSELF: 0
1. LITTLE INTEREST OR PLEASURE IN DOING THINGS: 1
SUM OF ALL RESPONSES TO PHQ9 QUESTIONS 1 & 2: 2
6. FEELING BAD ABOUT YOURSELF - OR THAT YOU ARE A FAILURE OR HAVE LET YOURSELF OR YOUR FAMILY DOWN: 0
8. MOVING OR SPEAKING SO SLOWLY THAT OTHER PEOPLE COULD HAVE NOTICED. OR THE OPPOSITE, BEING SO FIGETY OR RESTLESS THAT YOU HAVE BEEN MOVING AROUND A LOT MORE THAN USUAL: 0
7. TROUBLE CONCENTRATING ON THINGS, SUCH AS READING THE NEWSPAPER OR WATCHING TELEVISION: 1
4. FEELING TIRED OR HAVING LITTLE ENERGY: 1
5. POOR APPETITE OR OVEREATING: 0
SUM OF ALL RESPONSES TO PHQ QUESTIONS 1-9: 5
SUM OF ALL RESPONSES TO PHQ QUESTIONS 1-9: 5
2. FEELING DOWN, DEPRESSED OR HOPELESS: 1

## 2022-08-31 ASSESSMENT — SOCIAL DETERMINANTS OF HEALTH (SDOH): HOW HARD IS IT FOR YOU TO PAY FOR THE VERY BASICS LIKE FOOD, HOUSING, MEDICAL CARE, AND HEATING?: NOT HARD AT ALL

## 2022-08-31 NOTE — PROGRESS NOTES
Patient:  Erin Jimenez  MRN: [id number]  Date of Service: 2022   1965      CHIEF COMPLAINT:    Chief Complaint   Patient presents with    Pre-op Exam     Scope and D & C from OB/GYN       History Obtained From:  patient    HISTORY OF PRESENT ILLNESS:   The patient is a 61 y.o. female with prior history of Hyperlipidemia is here for a general check up and a [pre op exam.  Katrin Lehman going to undergo a [email or web address] exam by her gyn specialist.She has no chest pain. No shortness of breath. No bd pain. No rectal bleeding and no blood in urine. She can do all the household work at home and can take steps without getting short of breath. No palpitations. Past medical, surgical and family history reviewed and updated. Medications, allergies, and social history reviewed and updated. ROS:  Negative     Physical Exam:      General appearance: alert, appears stated age, and cooperative  Vitals:   Vitals:    22 1116   BP: 114/62   Site: Left Upper Arm   Position: Sitting   Cuff Size: Large Adult   Pulse: 76   Resp: 16   Temp: 97 °F (36.1 °C)   TempSrc: Infrared   SpO2: 96%   Weight: 206 lb (93.4 kg)   Height: 5' 6\" (1.676 m)     Weight:   Wt Readings from Last 5 Encounters:   22 206 lb (93.4 kg)   22 206 lb (93.4 kg)   22 203 lb (92.1 kg)   22 200 lb 9.6 oz (91 kg)   22 198 lb (89.8 kg)      Skin: Skin color, texture, turgor normal. No rashes or lesions. HEENT: Head: Normocephalic, no lesions, without obvious abnormality. Head: Normal, normocephalic, atraumatic. Eye: Normal external eye, conjunctiva, lids cornea, ROSCOE. Ears: Normal TM's bilaterally. Normal auditory canals and external ears. Non-tender. Nose: Normal external nose, mucus membranes and septum. Neck / Thyroid: Supple, no masses, nodes, nodules or enlargement.   Neck: no adenopathy, no carotid bruit, no JVD, supple, symmetrical, trachea midline, and thyroid not enlarged, symmetric, no tenderness/mass/nodules  Lungs: clear to auscultation bilaterally  Heart: regular rate and rhythm, S1, S2 normal, no murmur, click, rub or gallop  Abdomen: soft, non-tender; bowel sounds normal; no masses,  no organomegaly  Extremities: extremities normal, atraumatic, no cyanosis or edema  Neurologic: Mental status: Alert, oriented, thought content appropriate    Labs:  CBC:   Lab Results   Component Value Date/Time    WBC 7.7 01/28/2021 07:36 PM    RBC 4.44 01/28/2021 07:36 PM    HGB 14.2 01/28/2021 07:36 PM    HCT 41.7 01/28/2021 07:36 PM    MCV 93.9 01/28/2021 07:36 PM    MCH 32.0 01/28/2021 07:36 PM    MCHC 34.1 01/28/2021 07:36 PM    RDW 12.1 01/28/2021 07:36 PM     01/28/2021 07:36 PM    MPV 10.8 01/28/2021 07:36 PM     WBC:    Lab Results   Component Value Date/Time    WBC 7.7 01/28/2021 07:36 PM     Platelets:    Lab Results   Component Value Date/Time     01/28/2021 07:36 PM     CMP:    Lab Results   Component Value Date/Time     01/28/2021 07:36 PM    K 4.4 01/28/2021 07:36 PM     01/28/2021 07:36 PM    CO2 23 01/28/2021 07:36 PM    BUN 24 01/28/2021 07:36 PM    CREATININE 1.0 01/28/2021 07:36 PM    GFRAA >60 01/28/2021 07:36 PM    LABGLOM 57 01/28/2021 07:36 PM    GLUCOSE 106 01/28/2021 07:36 PM    PROT 7.7 01/28/2021 07:36 PM    LABALBU 4.8 01/28/2021 07:36 PM    CALCIUM 10.0 01/28/2021 07:36 PM    BILITOT 0.2 01/28/2021 07:36 PM    ALKPHOS 63 01/28/2021 07:36 PM    AST 20 01/28/2021 07:36 PM    ALT 13 01/28/2021 07:36 PM     BMP:    Lab Results   Component Value Date/Time     01/28/2021 07:36 PM    K 4.4 01/28/2021 07:36 PM     01/28/2021 07:36 PM    CO2 23 01/28/2021 07:36 PM    BUN 24 01/28/2021 07:36 PM    LABALBU 4.8 01/28/2021 07:36 PM    CREATININE 1.0 01/28/2021 07:36 PM    CALCIUM 10.0 01/28/2021 07:36 PM    GFRAA >60 01/28/2021 07:36 PM    LABGLOM 57 01/28/2021 07:36 PM    GLUCOSE 106 01/28/2021 07:36 PM     Potassium:    Lab Results   Component Value Date/Time    K 4.4 01/28/2021 07:36 PM     Calcium:    Lab Results   Component Value Date/Time    CALCIUM 10.0 01/28/2021 07:36 PM     Magnesium:    Lab Results   Component Value Date/Time    MG 1.9 01/28/2021 07:36 PM     Uric Acid:    Lab Results   Component Value Date/Time    LABURIC 4.7 07/06/2016 12:00 PM      -----------------------------------------------------------------  EKG: Sinus bradycardia with non specific st-T changes. Colonoscopy: There are no preventive care reminders to display for this patient. Mammogram: There are no preventive care reminders to display for this patient. Assessment and Plan   1 PRE OP EXAM  Check CBC and CMP  General exam is normal.No shortness of breath. No chest pain. No palpitations. Labs drawn today  EKG EXAM Completed. Reviewed. 2 Seasonal allergic Rhinitis due to other allergic trigger. Renew script for Flonase and her symptoms of Rhinitis are stable. 3 Hyperlipidemia  Low fat diet  Goal LDL is ,100. Pt is clear for her upcoming Gyn procedure with minimal risk    Labs reviewed with patient. Medications reviewed with patient. All questions answered. Return in about 3 months (around 11/30/2022).      Randee Alanis MD  6:54 PM  8/31/2022

## 2022-09-06 ENCOUNTER — HOSPITAL ENCOUNTER (OUTPATIENT)
Age: 57
Discharge: HOME | End: 2022-09-08

## 2022-09-06 ENCOUNTER — OUTSIDE SERVICES (OUTPATIENT)
Dept: OBGYN | Age: 57
End: 2022-09-06

## 2022-09-06 DIAGNOSIS — R93.5 ABNORMAL ENDOMETRIAL ULTRASOUND: ICD-10-CM

## 2022-09-06 DIAGNOSIS — R87.618 UNEXPLAINED ENDOMETRIAL CELLS ON CERVICAL PAP SMEAR: Primary | ICD-10-CM

## 2022-09-06 PROCEDURE — 88305 TISSUE EXAM BY PATHOLOGIST: CPT

## 2022-10-18 ENCOUNTER — TELEPHONE (OUTPATIENT)
Dept: FAMILY MEDICINE CLINIC | Age: 57
End: 2022-10-18

## 2022-10-18 NOTE — TELEPHONE ENCOUNTER
Patient called stating that she notice that her Cholesterol was high, and was wondering if she should be on a low dose of Crestor, please call patient.

## 2022-10-18 NOTE — OP NOTE
[facility] [address] [place] [address]    Outpatient Post Op Note D/C   Signed    Patient: Nya Waterman         MR#: [id number]    : 1965         Acct:[id number]    Age/Sex: 61 / F         ADM Date: 22    Loc: [facility]                 Provider Location:              cc: Dr. Emily Estevez; Dr. Sharlene Soriano    Date of Procedure:   22    Surgeon:   Emily Estevez MD    Preoperative Diagnosis:   1. Endometrial cells on Pap smear  2. Abnormal endometrial ultrasound, suspect polyp    Post-Op Diagnosis:   Same, pathology pending    Operative Procedure:   1. Video hysteroscopy  2. Hysteroscopic myomectomy with MyoSure-REACH  3. Hysteroscopic polypectomy with MyoSure-REACH  4.  Dilation and curettage    Implants: No    General Anesthesia Type: General    Fluids Replaced:    IVF: 1300 mL crystalloid   Hysteroscopic fluid in:  1500 mL. Hysteroscopic fluid out:  1450 mL. Net hysteroscopic fluid balance: -50 mL    Urine Output:  Not measured. Drains: None. Indications for Procedure: 50-year-old white female  2 para 2 who has been on replacement therapy (estradiol patch 0.025 mg every 3 days and progesterone 200 mg daily) since 2018. Patient presented on 2022 for an annual health maintenance examination. At that time she noted that she had just discontinued HRT 2 weeks prior the intent to stay off the medication. She denied vaginal bleeding and vasomotor symptoms. Subsequently her Pap smear report showed endometrial cells present. She was scheduled for pelvic ultrasound/saline infused sonogram and an endometrial biopsy on 2022. On the SIS, endometrial cavity irregularities were identified consistent with polyps.   Endometrial biopsy was not performed secondary to patient's discomfort during the same infused sonogram and the need for further diagnostic evaluation of the endometrial cavity irregularities by hysteroscopy with directed biopsies. The need for further diagnostic evaluation of the endometrial cavity defect was discussed and agreed upon. The fact that hysteroscopy D&C with polypectomy could be both diagnostic and therapeutic was also discussed. The risks specific to this surgery discussed included, but were not limited to that of anesthesia, infection, hemorrhage, transfusion, uterine perforation, cervical laceration, bowel/bladder/ vascular/renal injury and any corrective surgeries to repair said injuries to bowel, bladder, vascular, and ureters as well as deep vein thrombosis, pulmonary embolism, pain, and death. Questions were answered to the patient's satisfaction. Informed consent was obtained to proceed with hysteroscopy, hysteroscopic polypectomy, dilatation and curettage as planned. Findings: Exam under anesthesia revealed normal external female genitalia with moist, pink vaginal mucosa. Normal- appearing rugae. There was no discharge or odor. Cervix is multiparous with no gross lesions. Uterus is normal in size and anteverted. The adnexa are free with no palpable masses. Bartholin's, urethral, and Queensland's glands are normal. Pelvic support was adequate. Anus was patent with good sphincter tone. Hysteroscopic Findings- The uterus was sounded to 8.5 cm in an ante verted direction. The endocervical canal was smooth. The bilateral ostia were visualized and appeared normal. The uterine cavity was of normal size, shape, and contour. The endometrium itself was pale and atrophic with several polyps consistent with cystic atrophy were identified scattered diffusely throughout the uterine cavity. There was mild increased vascularity on the polyp tissue. There was a sub-1 cm submucosal fibroid in the lower uterine segment to the right of midline. Post myomectomy, polypectomy and curettage hysteroscopy revealed no residual submucosal lesions.  Pictures were taken throughout the course of the procedure for the permanent record. Condition:  The patient was transferred to recovery room in awake stable postoperative state. Description of Procedure: The patient was taken to the operating room with intravenous line running. She was placed on the operating room table in the dorsal supine position. General anesthesia was administered without difficulty. She was then placed in dorsal lithotomy position. She was prepped with Betadine solution and draped in the usual sterile fashion. Examination under anesthesia was performed with findings as mentioned above. A weighted Christianson speculum was placed in the patient´s posterior vagina and a right angle to the anterior vagina which revealed the anterior lip of the cervix. This was grasped with a single-tooth tenaculum. The uterus was then gently sounded to the level of the uterine fundus in an anteverted direction to a depth of 8.5 cm. The cervix was then serially dilated in a systematic fashion using [employer] cervical dilators to size 21-Colombian. The 2.9 mm Win operative hysteroscope was then assembled and white balanced. It was gently introduced into the uterine cavity using normal saline as a distending medium under direct visualization of the camera. Hysteroscopic findings were as mentioned above. Due to the submucosal fibroid in the lower uterine segment and the number and the fundal location of some of the polyps identified the decision was made to proceed with the MyoSure 6.25 mm operative hysteroscope. The [place] hysteroscope was withdrawn, and the MyoSure hysteroscope was assembled and white balanced. The endocervical canal was further dilated in a serial fashion to size 26-Colombian. The MyoSure operative hysteroscope was then gently introduced into the uterine cavity using normal saline the distending medium under direct visualization of the camera. The findings were confirmed  as above.  The  MyoSure-Reach hysteroscopic morcellator was introduced to the operative port of the hysteroscope, and the uterine polyps were detached from their attachments to the myometrium in a serial fashion, starting on the anterior wall and moving in a clockwise fashion until all polyps were adequately resected and detached from their attachments to the myometrium. Attention was then turned to the submucosal fibroid which was resected using the MyoSure morcellator in a similar fashion. The specimen were aspirated via the morcellator into the specimen sock. This procedure was repeated throughout the uterine cavity until it was felt that an adequate polypectomy and myomectomy had been performed. The specimen was collected and sent to pathology for permanent section. The hysteroscope was then slowly withdrawn. The medium-sized #2 sharp curette was then gently introduced into uterine cavity, to the level of the uterine fundus. A sharp curettage was performed in a four-quadrant fashion covering all directions until a gritty texture was noted and good uterine cry was felt throughout the uterus. A scant amount of specimen was collected and sent to pathology for permanent section. A second look with the hysteroscope confirmed adequate polypectomy and curettage had been performed. There was no evidence of residual polyp fragments or other abnormalities visualized. There was minimal bleeding noted . The procedure was terminated. All instrumentation was removed from the patient's vagina. The tenaculum sites noted to be hemostatic. Hemostasis was confirmed, and the procedure was terminated. The patient was returned to the dorsal supine position and anesthesia was reversed without difficulty. The patient was transferred to the recovery room in an awake, stable postoperative state. She will be discharged home instructions follow-up in the office in 2 weeks' time. She is to call for fever, severe abdominal pain, bleeding heavier than a period, or questions or concerns.   She is to do no heavy lifting or straining, will be on complete pelvic rest, and have nothing in the vagina. She is to avoid sex, tampons, douching, tub bathing, and swimming as well. She may shower only. Complications: No    Specimens Removed: Yes (A.  Endometrial polyp fragments; B.  Endometrial curetting)    EBL (ml): 5    Discharge Outpatient Surgery    - Follow up Plan  Discharge Follow Up: 2 Weeks (September 21, 2022 at 1:10 PM)    - Discharge Instructions  Shower: Audie Prieto to [facility]    - Discharge Instructions  Instructions:  DI for Hysteroscopy, Discharge Instructions D&C [facility], [facility] General Post-Operative Instructions  Additional Instructions:   Use prescription strength ibuprofen 600 mg every 6 hours as needed for pain postoperatively. You may supplement between doses of ibuprofen with Tylenol as needed. You may substitute over-the-counter ibuprofen instead of prescription strength, you can take three 200 mg over-the-counter tablets (600 mg) every 6 hours instead. Do not use prescription strength and over-the-counter ibuprofen together at the same time - if you switch between the 2 - separate doses by 6 hours. Last time we spoke you said you were no longer taking estrogen and progesterone. If you have discontinued them, I would suggest you stay off of them. If you are still taking them, then you may continue taking them until we have pathology results and our postop visit. Avoid sex, tampons, douching, tubs,hot tubs,swimming(nothing in the vagina) for 2 weeks. You may shower only. You may gradually resume all other normal activities in 24-48 hours. Call for fever, severe pain, bleeding heavier than a normal period or questions or concerns.     Office:[phone]    After Hours:[phone]   [place] given at 11:10 AM.        Dictated By: Dr. Jazmine Cesar By: <Electronically signed by Dr. Khris Valdez   10/18/22 9315                DD/DT: 09/06/22 1116

## 2022-10-19 RX ORDER — ROSUVASTATIN CALCIUM 10 MG/1
10 TABLET, COATED ORAL NIGHTLY
Qty: 30 TABLET | Refills: 3 | Status: SHIPPED
Start: 2022-10-19 | End: 2022-10-28 | Stop reason: SDUPTHER

## 2022-10-26 NOTE — TELEPHONE ENCOUNTER
Last Appointment:  8/31/2022  Future Appointments   Date Time Provider Andrey Velasco   12/5/2022  3:20 PM Haris Olivares  [facility]   6/21/2023  3:30 PM MERRICK Stinson - CNP [facility] Serve      Needs a 90 day supply sent to [facility].

## 2022-10-28 RX ORDER — ROSUVASTATIN CALCIUM 10 MG/1
10 TABLET, COATED ORAL NIGHTLY
Qty: 90 TABLET | Refills: 1 | Status: SHIPPED | OUTPATIENT
Start: 2022-10-28

## 2023-06-23 DIAGNOSIS — E78.5 HYPERLIPIDEMIA, UNSPECIFIED HYPERLIPIDEMIA TYPE: Primary | ICD-10-CM

## 2023-06-23 RX ORDER — ROSUVASTATIN CALCIUM 10 MG/1
TABLET, COATED ORAL
Qty: 90 TABLET | Refills: 0 | Status: SHIPPED | OUTPATIENT
Start: 2023-06-23

## 2023-06-23 NOTE — TELEPHONE ENCOUNTER
Last Appointment:  8/31/2022  Future Appointments   Date Time Provider Andrey Velasco   6/24/2024  3:30 PM MERRICK Durbin - CNP [facility] Janusz Hy

## 2023-07-19 ENCOUNTER — HOSPITAL ENCOUNTER (OUTPATIENT)
Age: 58
Discharge: HOME | End: 2023-07-19
Payer: COMMERCIAL

## 2023-07-19 DIAGNOSIS — E78.5 HYPERLIPIDEMIA, UNSPECIFIED HYPERLIPIDEMIA TYPE: ICD-10-CM

## 2023-07-19 LAB
ALBUMIN SERPL-MCNC: 4.6 G/DL (ref 3.5–5.2)
ALP SERPL-CCNC: 55 U/L (ref 35–104)
ALT SERPL-CCNC: 9 U/L (ref 0–32)
ANION GAP SERPL CALCULATED.3IONS-SCNC: 9 MMOL/L (ref 7–16)
AST SERPL-CCNC: 17 U/L (ref 0–31)
BILIRUB SERPL-MCNC: 0.4 MG/DL (ref 0–1.2)
BUN SERPL-MCNC: 17 MG/DL (ref 6–20)
CALCIUM SERPL-MCNC: 9.9 MG/DL (ref 8.6–10.2)
CHLORIDE SERPL-SCNC: 103 MMOL/L (ref 98–107)
CHOLEST SERPL-MCNC: 236 MG/DL
CO2 SERPL-SCNC: 26 MMOL/L (ref 22–29)
CREAT SERPL-MCNC: 2 MG/DL (ref 0.5–1)
GFR SERPL CREATININE-BSD FRML MDRD: 28 ML/MIN/1.73M2
GLUCOSE SERPL-MCNC: 79 MG/DL (ref 74–99)
HDLC SERPL-MCNC: 59 MG/DL
LDLC SERPL CALC-MCNC: 158 MG/DL
POTASSIUM SERPL-SCNC: 4 MMOL/L (ref 3.5–5)
PROT SERPL-MCNC: 7.1 G/DL (ref 6.4–8.3)
SODIUM SERPL-SCNC: 138 MMOL/L (ref 132–146)
TRIGL SERPL-MCNC: 94 MG/DL
TSH SERPL DL<=0.05 MIU/L-ACNC: 1.75 UIU/ML (ref 0.27–4.2)
VLDLC SERPL CALC-MCNC: 19 MG/DL

## 2023-07-19 PROCEDURE — 84443 ASSAY THYROID STIM HORMONE: CPT

## 2023-07-19 PROCEDURE — 80061 LIPID PANEL: CPT

## 2023-07-19 PROCEDURE — 36415 COLL VENOUS BLD VENIPUNCTURE: CPT

## 2023-07-19 PROCEDURE — 80053 COMPREHEN METABOLIC PANEL: CPT

## 2023-07-21 DIAGNOSIS — N18.4 STAGE 4 CHRONIC KIDNEY DISEASE (HCC): Primary | ICD-10-CM

## 2023-07-27 ENCOUNTER — HOSPITAL ENCOUNTER (OUTPATIENT)
Age: 58
Discharge: HOME | End: 2023-07-27
Payer: COMMERCIAL

## 2023-07-27 DIAGNOSIS — N18.4 STAGE 4 CHRONIC KIDNEY DISEASE (HCC): ICD-10-CM

## 2023-07-27 LAB
ANION GAP SERPL CALCULATED.3IONS-SCNC: 10 MMOL/L (ref 7–16)
BILIRUB UR QL STRIP: NEGATIVE
BUN SERPL-MCNC: 10 MG/DL (ref 6–20)
CALCIUM SERPL-MCNC: 9.6 MG/DL (ref 8.6–10.2)
CHLORIDE SERPL-SCNC: 102 MMOL/L (ref 98–107)
CLARITY UR: CLEAR
CO2 SERPL-SCNC: 27 MMOL/L (ref 22–29)
COLOR UR: YELLOW
COMMENT: ABNORMAL
CREAT SERPL-MCNC: 0.9 MG/DL (ref 0.5–1)
GFR SERPL CREATININE-BSD FRML MDRD: >60 ML/MIN/1.73M2
GLUCOSE SERPL-MCNC: 81 MG/DL (ref 74–99)
GLUCOSE UR STRIP-MCNC: NEGATIVE MG/DL
HGB UR QL STRIP.AUTO: NEGATIVE
KETONES UR STRIP-MCNC: NEGATIVE MG/DL
LEUKOCYTE ESTERASE UR QL STRIP: NEGATIVE
NITRITE UR QL STRIP: NEGATIVE
PH UR STRIP: 6 [PH] (ref 5–9)
POTASSIUM SERPL-SCNC: 4 MMOL/L (ref 3.5–5)
PROT UR STRIP-MCNC: NEGATIVE MG/DL
SODIUM SERPL-SCNC: 139 MMOL/L (ref 132–146)
SP GR UR STRIP: <1.005 (ref 1–1.03)
UROBILINOGEN UR STRIP-ACNC: 0.2 EU/DL (ref 0–1)

## 2023-07-27 PROCEDURE — 81003 URINALYSIS AUTO W/O SCOPE: CPT

## 2023-07-27 PROCEDURE — 80048 BASIC METABOLIC PNL TOTAL CA: CPT

## 2023-07-28 ENCOUNTER — OFFICE VISIT (OUTPATIENT)
Dept: FAMILY MEDICINE CLINIC | Age: 58
End: 2023-07-28
Payer: COMMERCIAL

## 2023-07-28 VITALS
WEIGHT: 181.8 LBS | RESPIRATION RATE: 18 BRPM | OXYGEN SATURATION: 96 % | HEART RATE: 61 BPM | HEIGHT: 66 IN | TEMPERATURE: 97.2 F | DIASTOLIC BLOOD PRESSURE: 60 MMHG | BODY MASS INDEX: 29.22 KG/M2 | SYSTOLIC BLOOD PRESSURE: 108 MMHG

## 2023-07-28 DIAGNOSIS — E78.5 HYPERLIPIDEMIA, UNSPECIFIED HYPERLIPIDEMIA TYPE: Primary | ICD-10-CM

## 2023-07-28 DIAGNOSIS — J30.89 SEASONAL ALLERGIC RHINITIS DUE TO OTHER ALLERGIC TRIGGER: ICD-10-CM

## 2023-07-28 DIAGNOSIS — R79.89 ABNORMAL SERUM CREATININE LEVEL: ICD-10-CM

## 2023-07-28 PROCEDURE — 3017F COLORECTAL CA SCREEN DOC REV: CPT | Performed by: INTERNAL MEDICINE

## 2023-07-28 PROCEDURE — G8417 CALC BMI ABV UP PARAM F/U: HCPCS | Performed by: INTERNAL MEDICINE

## 2023-07-28 PROCEDURE — 99213 OFFICE O/P EST LOW 20 MIN: CPT | Performed by: INTERNAL MEDICINE

## 2023-07-28 PROCEDURE — 1036F TOBACCO NON-USER: CPT | Performed by: INTERNAL MEDICINE

## 2023-07-28 PROCEDURE — G8427 DOCREV CUR MEDS BY ELIG CLIN: HCPCS | Performed by: INTERNAL MEDICINE

## 2023-07-28 RX ORDER — FLUTICASONE PROPIONATE 50 MCG
SPRAY, SUSPENSION (ML) NASAL
Qty: 16 G | Refills: 3 | Status: SHIPPED | OUTPATIENT
Start: 2023-07-28

## 2023-07-28 SDOH — ECONOMIC STABILITY: FOOD INSECURITY: WITHIN THE PAST 12 MONTHS, YOU WORRIED THAT YOUR FOOD WOULD RUN OUT BEFORE YOU GOT MONEY TO BUY MORE.: NEVER TRUE

## 2023-07-28 SDOH — ECONOMIC STABILITY: INCOME INSECURITY: HOW HARD IS IT FOR YOU TO PAY FOR THE VERY BASICS LIKE FOOD, HOUSING, MEDICAL CARE, AND HEATING?: NOT HARD AT ALL

## 2023-07-28 SDOH — ECONOMIC STABILITY: HOUSING INSECURITY
IN THE LAST 12 MONTHS, WAS THERE A TIME WHEN YOU DID NOT HAVE A STEADY PLACE TO SLEEP OR SLEPT IN A SHELTER (INCLUDING NOW)?: NO

## 2023-07-28 SDOH — ECONOMIC STABILITY: FOOD INSECURITY: WITHIN THE PAST 12 MONTHS, THE FOOD YOU BOUGHT JUST DIDN'T LAST AND YOU DIDN'T HAVE MONEY TO GET MORE.: NEVER TRUE

## 2023-07-28 ASSESSMENT — PATIENT HEALTH QUESTIONNAIRE - PHQ9
8. MOVING OR SPEAKING SO SLOWLY THAT OTHER PEOPLE COULD HAVE NOTICED. OR THE OPPOSITE, BEING SO FIGETY OR RESTLESS THAT YOU HAVE BEEN MOVING AROUND A LOT MORE THAN USUAL: 0
1. LITTLE INTEREST OR PLEASURE IN DOING THINGS: 0
SUM OF ALL RESPONSES TO PHQ9 QUESTIONS 1 & 2: 0
7. TROUBLE CONCENTRATING ON THINGS, SUCH AS READING THE NEWSPAPER OR WATCHING TELEVISION: 0
SUM OF ALL RESPONSES TO PHQ QUESTIONS 1-9: 0
9. THOUGHTS THAT YOU WOULD BE BETTER OFF DEAD, OR OF HURTING YOURSELF: 0
SUM OF ALL RESPONSES TO PHQ QUESTIONS 1-9: 0
4. FEELING TIRED OR HAVING LITTLE ENERGY: 0
2. FEELING DOWN, DEPRESSED OR HOPELESS: 0
6. FEELING BAD ABOUT YOURSELF - OR THAT YOU ARE A FAILURE OR HAVE LET YOURSELF OR YOUR FAMILY DOWN: 0
3. TROUBLE FALLING OR STAYING ASLEEP: 0
5. POOR APPETITE OR OVEREATING: 0

## 2023-08-02 ENCOUNTER — HOSPITAL ENCOUNTER (OUTPATIENT)
Dept: GENERAL RADIOLOGY | Age: 58
Discharge: HOME | End: 2023-08-04
Payer: COMMERCIAL

## 2023-08-02 VITALS — HEIGHT: 66 IN | BODY MASS INDEX: 28.93 KG/M2 | WEIGHT: 180 LBS

## 2023-08-02 DIAGNOSIS — Z12.31 SCREENING MAMMOGRAM FOR BREAST CANCER: ICD-10-CM

## 2023-08-02 PROCEDURE — 77063 BREAST TOMOSYNTHESIS BI: CPT

## 2023-10-17 ENCOUNTER — HOSPITAL ENCOUNTER (OUTPATIENT)
Age: 58
Discharge: HOME | End: 2023-10-17
Payer: COMMERCIAL

## 2023-10-17 ENCOUNTER — OFFICE VISIT (OUTPATIENT)
Dept: ENT CLINIC | Age: 58
End: 2023-10-17
Payer: COMMERCIAL

## 2023-10-17 VITALS
RESPIRATION RATE: 18 BRPM | HEART RATE: 58 BPM | BODY MASS INDEX: 28.45 KG/M2 | OXYGEN SATURATION: 99 % | SYSTOLIC BLOOD PRESSURE: 120 MMHG | WEIGHT: 177 LBS | DIASTOLIC BLOOD PRESSURE: 61 MMHG | HEIGHT: 66 IN

## 2023-10-17 DIAGNOSIS — R79.89 ABNORMAL SERUM CREATININE LEVEL: ICD-10-CM

## 2023-10-17 DIAGNOSIS — J30.89 SEASONAL ALLERGIC RHINITIS DUE TO OTHER ALLERGIC TRIGGER: ICD-10-CM

## 2023-10-17 LAB
ANION GAP SERPL CALCULATED.3IONS-SCNC: 12 MMOL/L (ref 7–16)
BUN SERPL-MCNC: 15 MG/DL (ref 6–20)
CALCIUM SERPL-MCNC: 9.9 MG/DL (ref 8.6–10.2)
CHLORIDE SERPL-SCNC: 102 MMOL/L (ref 98–107)
CO2 SERPL-SCNC: 24 MMOL/L (ref 22–29)
CREAT SERPL-MCNC: 0.8 MG/DL (ref 0.5–1)
GFR SERPL CREATININE-BSD FRML MDRD: >60 ML/MIN/1.73M2
GLUCOSE SERPL-MCNC: 92 MG/DL (ref 74–99)
POTASSIUM SERPL-SCNC: 4.1 MMOL/L (ref 3.5–5)
SODIUM SERPL-SCNC: 138 MMOL/L (ref 132–146)

## 2023-10-17 PROCEDURE — G8417 CALC BMI ABV UP PARAM F/U: HCPCS

## 2023-10-17 PROCEDURE — 1036F TOBACCO NON-USER: CPT

## 2023-10-17 PROCEDURE — 3017F COLORECTAL CA SCREEN DOC REV: CPT

## 2023-10-17 PROCEDURE — 99213 OFFICE O/P EST LOW 20 MIN: CPT

## 2023-10-17 PROCEDURE — G8427 DOCREV CUR MEDS BY ELIG CLIN: HCPCS

## 2023-10-17 PROCEDURE — G8484 FLU IMMUNIZE NO ADMIN: HCPCS

## 2023-10-17 PROCEDURE — 80048 BASIC METABOLIC PNL TOTAL CA: CPT

## 2023-10-17 RX ORDER — AZELASTINE 1 MG/ML
2 SPRAY, METERED NASAL 2 TIMES DAILY
Qty: 30 ML | Refills: 1 | Status: SHIPPED | OUTPATIENT
Start: 2023-10-17

## 2023-10-17 RX ORDER — FLUTICASONE PROPIONATE 50 MCG
SPRAY, SUSPENSION (ML) NASAL
Qty: 48 G | Refills: 3 | Status: SHIPPED | OUTPATIENT
Start: 2023-10-17

## 2023-10-17 ASSESSMENT — ENCOUNTER SYMPTOMS
BACK PAIN: 0
RHINORRHEA: 0
EYE DISCHARGE: 0
COUGH: 0
DIARRHEA: 0
SHORTNESS OF BREATH: 0
EYE PAIN: 0
VOMITING: 0
SORE THROAT: 0
ALLERGIC/IMMUNOLOGIC NEGATIVE: 1
SINUS PRESSURE: 1

## 2023-10-17 NOTE — PROGRESS NOTES
pressure nasal congestion and drainage. She denies history of recurrent sinus infections. She has underwent allergy testing in the past which revealed several sensitivities. At this time she is suffering from nasal congestion with noted postnasal drainage and rhinorrhea. At this time I would like her to continue her Flonase 1 spray to each nostril twice daily with the addition of Astelin spray 1 spray to each nostril twice daily. She did verbalize understanding was in agreement to this plan. She was instructed to call the office for any new or worsening symptoms prior to her next appointment. Follow up in 6 week(s)      RX given today:  Juliane Chavez.  Magi Ortiz MSN, FNP-BC  [address] [place] [address], Nose and Throat    The information contained in this note has been dictated using drug and medical speech recognition software and may contain errors

## 2023-11-16 ENCOUNTER — HOSPITAL ENCOUNTER (OUTPATIENT)
Age: 58
Discharge: HOME | End: 2023-11-16
Payer: COMMERCIAL

## 2023-11-16 DIAGNOSIS — J30.89 SEASONAL ALLERGIC RHINITIS DUE TO OTHER ALLERGIC TRIGGER: ICD-10-CM

## 2023-11-16 DIAGNOSIS — E78.5 HYPERLIPIDEMIA, UNSPECIFIED HYPERLIPIDEMIA TYPE: ICD-10-CM

## 2023-11-16 LAB
ALBUMIN SERPL-MCNC: 4.4 G/DL (ref 3.5–5.2)
ALP SERPL-CCNC: 72 U/L (ref 35–104)
ALT SERPL-CCNC: 18 U/L (ref 0–32)
ANION GAP SERPL CALCULATED.3IONS-SCNC: 10 MMOL/L (ref 7–16)
AST SERPL-CCNC: 25 U/L (ref 0–31)
BASOPHILS # BLD: 0.1 K/UL (ref 0–0.2)
BASOPHILS NFR BLD: 2 % (ref 0–2)
BILIRUB SERPL-MCNC: 0.4 MG/DL (ref 0–1.2)
BUN SERPL-MCNC: 16 MG/DL (ref 6–20)
CALCIUM SERPL-MCNC: 9.7 MG/DL (ref 8.6–10.2)
CHLORIDE SERPL-SCNC: 103 MMOL/L (ref 98–107)
CHOLEST SERPL-MCNC: 169 MG/DL
CO2 SERPL-SCNC: 26 MMOL/L (ref 22–29)
CREAT SERPL-MCNC: 1 MG/DL (ref 0.5–1)
EOSINOPHIL # BLD: 0.21 K/UL (ref 0.05–0.5)
EOSINOPHILS RELATIVE PERCENT: 4 % (ref 0–6)
ERYTHROCYTE [DISTWIDTH] IN BLOOD BY AUTOMATED COUNT: 12.7 % (ref 11.5–15)
GFR SERPL CREATININE-BSD FRML MDRD: >60 ML/MIN/1.73M2
GLUCOSE SERPL-MCNC: 91 MG/DL (ref 74–99)
HCT VFR BLD AUTO: 43.8 % (ref 34–48)
HDLC SERPL-MCNC: 72 MG/DL
HGB BLD-MCNC: 13.8 G/DL (ref 11.5–15.5)
IMM GRANULOCYTES # BLD AUTO: <0.03 K/UL (ref 0–0.58)
IMM GRANULOCYTES NFR BLD: 0 % (ref 0–5)
LDLC SERPL CALC-MCNC: 79 MG/DL
LYMPHOCYTES NFR BLD: 1.89 K/UL (ref 1.5–4)
LYMPHOCYTES RELATIVE PERCENT: 33 % (ref 20–42)
MCH RBC QN AUTO: 30.3 PG (ref 26–35)
MCHC RBC AUTO-ENTMCNC: 31.5 G/DL (ref 32–34.5)
MCV RBC AUTO: 96.1 FL (ref 80–99.9)
MONOCYTES NFR BLD: 0.52 K/UL (ref 0.1–0.95)
MONOCYTES NFR BLD: 9 % (ref 2–12)
NEUTROPHILS NFR BLD: 53 % (ref 43–80)
NEUTS SEG NFR BLD: 3.08 K/UL (ref 1.8–7.3)
PLATELET # BLD AUTO: 255 K/UL (ref 130–450)
PMV BLD AUTO: 10.8 FL (ref 7–12)
POTASSIUM SERPL-SCNC: 4.2 MMOL/L (ref 3.5–5)
PROT SERPL-MCNC: 7.4 G/DL (ref 6.4–8.3)
RBC # BLD AUTO: 4.56 M/UL (ref 3.5–5.5)
SODIUM SERPL-SCNC: 139 MMOL/L (ref 132–146)
TRIGL SERPL-MCNC: 89 MG/DL
VLDLC SERPL CALC-MCNC: 18 MG/DL
WBC OTHER # BLD: 5.8 K/UL (ref 4.5–11.5)

## 2023-11-16 PROCEDURE — 80053 COMPREHEN METABOLIC PANEL: CPT

## 2023-11-16 PROCEDURE — 85025 COMPLETE CBC W/AUTO DIFF WBC: CPT

## 2023-11-16 PROCEDURE — 80061 LIPID PANEL: CPT

## 2023-11-17 ENCOUNTER — OFFICE VISIT (OUTPATIENT)
Dept: FAMILY MEDICINE CLINIC | Age: 58
End: 2023-11-17

## 2023-11-17 VITALS
HEART RATE: 62 BPM | DIASTOLIC BLOOD PRESSURE: 78 MMHG | RESPIRATION RATE: 18 BRPM | TEMPERATURE: 97.7 F | OXYGEN SATURATION: 98 % | WEIGHT: 184.2 LBS | HEIGHT: 66 IN | BODY MASS INDEX: 29.6 KG/M2 | SYSTOLIC BLOOD PRESSURE: 92 MMHG

## 2023-11-17 DIAGNOSIS — R42 DIZZINESS: Primary | ICD-10-CM

## 2023-11-17 DIAGNOSIS — Z23 NEED FOR INFLUENZA VACCINATION: ICD-10-CM

## 2023-11-17 RX ORDER — MECLIZINE HCL 12.5 MG/1
12.5 TABLET ORAL 2 TIMES DAILY PRN
Qty: 10 TABLET | Refills: 0 | Status: SHIPPED | OUTPATIENT
Start: 2023-11-17 | End: 2023-11-22

## 2023-11-17 NOTE — PROGRESS NOTES
Auto Differential; Future  -     Magnesium; Future  -     Vitamin B12; Future  Trial of Antivert 12.5 mgm po bid s needed or Dizziness. Need for influenza vaccination  -     Influenza, FLUCELVAX, (age 10 mo+), IM, Preservative Free, 0.5 mL  Side effects and benefits of influenza vaccine explined. Other orders  -     meclizine (ANTIVERT) 12.5 MG tablet; Take 1 tablet by mouth 2 times daily as needed for Dizziness  Side effects and benefits of meclizine explained        Labs reviewed with patient. Medications reviewed with patient. All questions answered. Return in about 3 months (around 2/17/2024) for Dizziness.      Karlene Palumbo MD  6:08 PM  11/17/2023

## 2023-11-28 ENCOUNTER — PROCEDURE VISIT (OUTPATIENT)
Dept: AUDIOLOGY | Age: 58
End: 2023-11-28
Payer: COMMERCIAL

## 2023-11-28 ENCOUNTER — OFFICE VISIT (OUTPATIENT)
Dept: ENT CLINIC | Age: 58
End: 2023-11-28
Payer: COMMERCIAL

## 2023-11-28 VITALS
RESPIRATION RATE: 12 BRPM | HEIGHT: 66 IN | SYSTOLIC BLOOD PRESSURE: 113 MMHG | DIASTOLIC BLOOD PRESSURE: 72 MMHG | BODY MASS INDEX: 28.93 KG/M2 | WEIGHT: 180 LBS | TEMPERATURE: 97.6 F | HEART RATE: 65 BPM

## 2023-11-28 DIAGNOSIS — J30.89 SEASONAL ALLERGIC RHINITIS DUE TO OTHER ALLERGIC TRIGGER: ICD-10-CM

## 2023-11-28 DIAGNOSIS — R42 VERTIGO: ICD-10-CM

## 2023-11-28 DIAGNOSIS — R42 VERTIGO: Primary | ICD-10-CM

## 2023-11-28 DIAGNOSIS — H93.8X9 PRESSURE SENSATION IN EAR, UNSPECIFIED LATERALITY: ICD-10-CM

## 2023-11-28 DIAGNOSIS — H93.13 TINNITUS OF BOTH EARS: Primary | ICD-10-CM

## 2023-11-28 DIAGNOSIS — H93.13 TINNITUS OF BOTH EARS: ICD-10-CM

## 2023-11-28 PROCEDURE — 3017F COLORECTAL CA SCREEN DOC REV: CPT

## 2023-11-28 PROCEDURE — G8427 DOCREV CUR MEDS BY ELIG CLIN: HCPCS

## 2023-11-28 PROCEDURE — 92557 COMPREHENSIVE HEARING TEST: CPT

## 2023-11-28 PROCEDURE — G8417 CALC BMI ABV UP PARAM F/U: HCPCS

## 2023-11-28 PROCEDURE — 99213 OFFICE O/P EST LOW 20 MIN: CPT

## 2023-11-28 PROCEDURE — G8482 FLU IMMUNIZE ORDER/ADMIN: HCPCS

## 2023-11-28 PROCEDURE — 1036F TOBACCO NON-USER: CPT

## 2023-11-28 PROCEDURE — 92567 TYMPANOMETRY: CPT

## 2023-11-28 ASSESSMENT — ENCOUNTER SYMPTOMS
SHORTNESS OF BREATH: 0
RHINORRHEA: 0
COUGH: 0
DIARRHEA: 0
VOMITING: 0
EYE PAIN: 0
EYE DISCHARGE: 0
SORE THROAT: 0
SINUS PRESSURE: 0
BACK PAIN: 0
ALLERGIC/IMMUNOLOGIC NEGATIVE: 1

## 2023-11-28 NOTE — PROGRESS NOTES
This patient was referred for audiometric and tympanometric testing by SHAHIDA Duffy due to vertigo, tinnitus of both ears, and ear pressure. Audiometry using pure tone air and bone conduction testing revealed hearing sensitivity within normal limits, bilaterally. Reliability was good. Speech reception thresholds were in good agreement with the pure tone averages, bilaterally. Speech discrimination scores were excellent, bilaterally. Tympanometry revealed normal middle ear peak pressure and compliance, bilaterally. The results were reviewed with the patient. Recommendations for follow up will be made pending physician consult.     Aneta Huynh, POWER  3rd Year Student    Hiwot Burrell/CCC-A  [place] [employer] [id number]  Electronically signed by Hiwot Burrell on 11/28/2023 at 2:53 PM

## 2023-12-13 ENCOUNTER — TELEPHONE (OUTPATIENT)
Dept: ENT CLINIC | Age: 58
End: 2023-12-13

## 2023-12-13 NOTE — TELEPHONE ENCOUNTER
Patient asking if she should be seen since the last time she was tested she was not having any acute vertigo symptoms at that time. Okay to schedule patient per Mani Lambert NP. Apt 12/15/23 9753. Patient notified.

## 2023-12-13 NOTE — TELEPHONE ENCOUNTER
Patient said she is experiencing vertigo and would like to know what she can do before her testing in January. Please advise.

## 2023-12-13 NOTE — TELEPHONE ENCOUNTER
Called patient back regarding regarding Vertigo. Per Yvonne Darby he is waiting for patient to have VNG testing to confirm source of her vertigo since her testing was previously negative in the office. In the meantime, if patient s/s are severe patient may go to ED. Patient notified.

## 2023-12-15 ENCOUNTER — TELEPHONE (OUTPATIENT)
Dept: ENT CLINIC | Age: 58
End: 2023-12-15

## 2023-12-15 ENCOUNTER — OFFICE VISIT (OUTPATIENT)
Dept: ENT CLINIC | Age: 58
End: 2023-12-15
Payer: COMMERCIAL

## 2023-12-15 VITALS
TEMPERATURE: 97.5 F | BODY MASS INDEX: 28.93 KG/M2 | WEIGHT: 180 LBS | DIASTOLIC BLOOD PRESSURE: 74 MMHG | RESPIRATION RATE: 12 BRPM | SYSTOLIC BLOOD PRESSURE: 132 MMHG | HEART RATE: 60 BPM | HEIGHT: 66 IN

## 2023-12-15 DIAGNOSIS — H81.11 BENIGN PAROXYSMAL POSITIONAL VERTIGO OF RIGHT EAR: Primary | ICD-10-CM

## 2023-12-15 PROCEDURE — G8417 CALC BMI ABV UP PARAM F/U: HCPCS

## 2023-12-15 PROCEDURE — 99213 OFFICE O/P EST LOW 20 MIN: CPT

## 2023-12-15 PROCEDURE — G8427 DOCREV CUR MEDS BY ELIG CLIN: HCPCS

## 2023-12-15 PROCEDURE — G8482 FLU IMMUNIZE ORDER/ADMIN: HCPCS

## 2023-12-15 PROCEDURE — 1036F TOBACCO NON-USER: CPT

## 2023-12-15 PROCEDURE — 3017F COLORECTAL CA SCREEN DOC REV: CPT

## 2023-12-15 PROCEDURE — 95992 CANALITH REPOSITIONING PROC: CPT

## 2023-12-15 NOTE — TELEPHONE ENCOUNTER
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 It is due at the end of this month, and it not advised for the patient to do this at this time. Excuse for patient will be mailed to her home address.    Electronically signed by Yamileth Young LPN on 71/77/7720 at 1:34 PM Detail Level: Detailed Quality 110: Preventive Care And Screening: Influenza Immunization: Influenza Immunization not Administered because Patient Refused.

## 2024-01-15 ENCOUNTER — OFFICE VISIT (OUTPATIENT)
Dept: ENT CLINIC | Age: 59
End: 2024-01-15
Payer: COMMERCIAL

## 2024-01-15 VITALS
HEIGHT: 66 IN | SYSTOLIC BLOOD PRESSURE: 114 MMHG | BODY MASS INDEX: 28.93 KG/M2 | HEART RATE: 64 BPM | RESPIRATION RATE: 12 BRPM | OXYGEN SATURATION: 99 % | WEIGHT: 180 LBS | DIASTOLIC BLOOD PRESSURE: 72 MMHG | TEMPERATURE: 97.5 F

## 2024-01-15 DIAGNOSIS — R42 VERTIGO: Primary | ICD-10-CM

## 2024-01-15 PROCEDURE — G8427 DOCREV CUR MEDS BY ELIG CLIN: HCPCS

## 2024-01-15 PROCEDURE — 1036F TOBACCO NON-USER: CPT

## 2024-01-15 PROCEDURE — 99212 OFFICE O/P EST SF 10 MIN: CPT

## 2024-01-15 PROCEDURE — G8417 CALC BMI ABV UP PARAM F/U: HCPCS

## 2024-01-15 PROCEDURE — 3017F COLORECTAL CA SCREEN DOC REV: CPT

## 2024-01-15 PROCEDURE — G8482 FLU IMMUNIZE ORDER/ADMIN: HCPCS

## 2024-01-15 NOTE — PROGRESS NOTES
Subjective:      Patient ID:  Radha Asif is a 58 y.o. female.    HPI Comments: Pt returns for recheck of dizziness.  Pt is not feeling better and does not have spinning sensation. Patient states int he immediate 2 weeks after last appointment symptoms worsened with all of the holiday prep. Over the past 2 weeks has noted a mild improvement. Not feeling spinning sensation. Notes a few moment of dizziness with rapid head movements. Continues to take flonase and astelin.     Past Medical History:   Diagnosis Date    Abdominal cramping     Anxiety     Asthma     controlled per patient    Constipation     Delivery normal     x2    Depression     Left arm pain     had cardiac work-up 2012? 3/16/15 -resolved    SOB (shortness of breath)     resolved     Past Surgical History:   Procedure Laterality Date    BREAST BIOPSY Right     benign    BREAST SURGERY Right 1980    cyst    COLONOSCOPY  03/2015    Dr. Dorantes, repeat in 10 years    CYST REMOVAL      tailbone, face    DILATION AND CURETTAGE  09/06/2022    [facility] polypectomy/myomectomy - Myosure-Reach    FACIAL COSMETIC SURGERY      TUBAL LIGATION  1995     Family History   Problem Relation Age of Onset    Heart Attack Father     Coronary Art Dis Father     Breast Cancer Paternal Grandmother      Social History     Socioeconomic History    Marital status: Single     Spouse name: None    Number of children: None    Years of education: None    Highest education level: None   Tobacco Use    Smoking status: Never     Passive exposure: Past    Smokeless tobacco: Never   Vaping Use    Vaping Use: Never used   Substance and Sexual Activity    Alcohol use: Not Currently     Alcohol/week: 1.0 standard drink of alcohol     Types: 1 Standard drinks or equivalent per week     Comment: Social use    Drug use: Never    Sexual activity: Yes     Partners: Male     Birth control/protection: Surgical     Comment: btl     Social Determinants of Health     Financial Resource Strain:

## 2024-01-23 ENCOUNTER — HOSPITAL ENCOUNTER (OUTPATIENT)
Dept: AUDIOLOGY | Age: 59
Discharge: HOME | End: 2024-01-23
Payer: COMMERCIAL

## 2024-01-23 PROCEDURE — 92537 CALORIC VSTBLR TEST W/REC: CPT

## 2024-01-23 PROCEDURE — 92540 BASIC VESTIBULAR EVALUATION: CPT

## 2024-01-23 NOTE — PROGRESS NOTES
VNG EVALUATION    REASON FOR REFERRAL:  This patient was referred for VNG testing by SHAHIDA Yu due to dizziness.  Patient does not reported a spinning sensation, however, has dizziness and lightheadedness with rapid head movements. Patient previously had spinning sensation before treatment of flonase and astelin. Chivo Hallpike performed at ENT appointment on 12/15 and revealed positive results towards the right side and negative results toward the left. Chivo Hallpike performed at ENT appointment on 1/15 and results were negative on both sides.     RESULTS:  Bithermal caloric irrigations revealed appropriate beating nystagmus with no unilateral weakness.      Directional preponderance and fixation suppression were within normal limits.      No irregular eye movements were recorded during saccades, pendular tracking, or optokinetic testing.      No significant nystagmus was recorded during gaze or positional testing.        IMPRESSION:  VNG test results indicates vestibular function within normal limits bilaterally.      Oculomotor and positional test results were within normal limits.      If I can be of further assistance or provide additional information, please do not hesitate to contact this office.    Thank you for the referral.      __________________________________  Hiwot Galeano/CCC-A  [facility] [id number]  Electronically signed by Hiwot Galeano on 1/23/2024 at 1:34 PM

## 2024-01-30 ENCOUNTER — OFFICE VISIT (OUTPATIENT)
Dept: ENT CLINIC | Age: 59
End: 2024-01-30
Payer: COMMERCIAL

## 2024-01-30 VITALS
DIASTOLIC BLOOD PRESSURE: 67 MMHG | HEIGHT: 66 IN | TEMPERATURE: 97 F | BODY MASS INDEX: 28.93 KG/M2 | RESPIRATION RATE: 12 BRPM | WEIGHT: 180 LBS | SYSTOLIC BLOOD PRESSURE: 114 MMHG | HEART RATE: 55 BPM

## 2024-01-30 DIAGNOSIS — R42 VERTIGO: Primary | ICD-10-CM

## 2024-01-30 DIAGNOSIS — J30.89 SEASONAL ALLERGIC RHINITIS DUE TO OTHER ALLERGIC TRIGGER: ICD-10-CM

## 2024-01-30 PROCEDURE — 99213 OFFICE O/P EST LOW 20 MIN: CPT

## 2024-01-30 PROCEDURE — G8482 FLU IMMUNIZE ORDER/ADMIN: HCPCS

## 2024-01-30 PROCEDURE — G8417 CALC BMI ABV UP PARAM F/U: HCPCS

## 2024-01-30 PROCEDURE — 3017F COLORECTAL CA SCREEN DOC REV: CPT

## 2024-01-30 PROCEDURE — G8427 DOCREV CUR MEDS BY ELIG CLIN: HCPCS

## 2024-01-30 PROCEDURE — 1036F TOBACCO NON-USER: CPT

## 2024-01-30 ASSESSMENT — ENCOUNTER SYMPTOMS
SORE THROAT: 0
EYE DISCHARGE: 0
SHORTNESS OF BREATH: 0
VOMITING: 0
RHINORRHEA: 0
ALLERGIC/IMMUNOLOGIC NEGATIVE: 1
SINUS PRESSURE: 0
BACK PAIN: 0
DIARRHEA: 0
EYE PAIN: 0
COUGH: 0

## 2024-01-30 NOTE — PROGRESS NOTES
Subjective:      Patient ID:  Radha Asif is a 58 y.o. female.    HPI:    Radha Asif is here today for view of VNG results. The patient has not had recurring episodes of dizziness.  She states that since her initial episode of vertigo she is left with residual \"off balance\"  when turning her head too quickly or putting her arms above her head.     Past Medical History:   Diagnosis Date    Abdominal cramping     Anxiety     Asthma     controlled per patient    Constipation     Delivery normal     x2    Depression     Left arm pain     had cardiac work-up 2012? 3/16/15 -resolved    SOB (shortness of breath)     resolved     Past Surgical History:   Procedure Laterality Date    BREAST BIOPSY Right     benign    BREAST SURGERY Right 1980    cyst    COLONOSCOPY  03/2015    Dr. Dorantes, repeat in 10 years    CYST REMOVAL      tailbone, face    DILATION AND CURETTAGE  09/06/2022    [facility] polypectomy/myomectomy - Myosure-Reach    FACIAL COSMETIC SURGERY      TUBAL LIGATION  1995     Family History   Problem Relation Age of Onset    Heart Attack Father     Coronary Art Dis Father     Breast Cancer Paternal Grandmother      Social History     Socioeconomic History    Marital status: Single     Spouse name: None    Number of children: None    Years of education: None    Highest education level: None   Tobacco Use    Smoking status: Never     Passive exposure: Past    Smokeless tobacco: Never   Vaping Use    Vaping Use: Never used   Substance and Sexual Activity    Alcohol use: Not Currently     Alcohol/week: 1.0 standard drink of alcohol     Types: 1 Standard drinks or equivalent per week     Comment: Social use    Drug use: Never    Sexual activity: Yes     Partners: Male     Birth control/protection: Surgical     Comment: btl     Social Determinants of Health     Financial Resource Strain: Low Risk  (7/28/2023)    Overall Financial Resource Strain (CARDIA)     Difficulty of Paying Living Expenses: Not hard at all

## 2024-02-15 ENCOUNTER — HOSPITAL ENCOUNTER (OUTPATIENT)
Age: 59
Discharge: HOME | End: 2024-02-15
Payer: COMMERCIAL

## 2024-02-15 DIAGNOSIS — R42 DIZZINESS: ICD-10-CM

## 2024-02-15 LAB
BASOPHILS # BLD: 0.09 K/UL (ref 0–0.2)
BASOPHILS NFR BLD: 2 % (ref 0–2)
EOSINOPHIL # BLD: 0.13 K/UL (ref 0.05–0.5)
EOSINOPHILS RELATIVE PERCENT: 2 % (ref 0–6)
ERYTHROCYTE [DISTWIDTH] IN BLOOD BY AUTOMATED COUNT: 13.2 % (ref 11.5–15)
HCT VFR BLD AUTO: 43.2 % (ref 34–48)
HGB BLD-MCNC: 13.9 G/DL (ref 11.5–15.5)
IMM GRANULOCYTES # BLD AUTO: <0.03 K/UL (ref 0–0.58)
IMM GRANULOCYTES NFR BLD: 0 % (ref 0–5)
LYMPHOCYTES NFR BLD: 2.39 K/UL (ref 1.5–4)
LYMPHOCYTES RELATIVE PERCENT: 43 % (ref 20–42)
MAGNESIUM SERPL-MCNC: 1.9 MG/DL (ref 1.6–2.6)
MCH RBC QN AUTO: 30.8 PG (ref 26–35)
MCHC RBC AUTO-ENTMCNC: 32.2 G/DL (ref 32–34.5)
MCV RBC AUTO: 95.8 FL (ref 80–99.9)
MONOCYTES NFR BLD: 0.56 K/UL (ref 0.1–0.95)
MONOCYTES NFR BLD: 10 % (ref 2–12)
NEUTROPHILS NFR BLD: 43 % (ref 43–80)
NEUTS SEG NFR BLD: 2.43 K/UL (ref 1.8–7.3)
PLATELET # BLD AUTO: 252 K/UL (ref 130–450)
PMV BLD AUTO: 10.8 FL (ref 7–12)
RBC # BLD AUTO: 4.51 M/UL (ref 3.5–5.5)
VIT B12 SERPL-MCNC: 307 PG/ML (ref 211–946)
WBC OTHER # BLD: 5.6 K/UL (ref 4.5–11.5)

## 2024-02-15 PROCEDURE — 83735 ASSAY OF MAGNESIUM: CPT

## 2024-02-15 PROCEDURE — 85025 COMPLETE CBC W/AUTO DIFF WBC: CPT

## 2024-02-15 PROCEDURE — 82607 VITAMIN B-12: CPT

## 2024-02-15 PROCEDURE — 36415 COLL VENOUS BLD VENIPUNCTURE: CPT

## 2024-02-16 ENCOUNTER — OFFICE VISIT (OUTPATIENT)
Dept: FAMILY MEDICINE CLINIC | Age: 59
End: 2024-02-16
Payer: COMMERCIAL

## 2024-02-16 VITALS
HEART RATE: 53 BPM | OXYGEN SATURATION: 94 % | HEIGHT: 66 IN | WEIGHT: 186.8 LBS | TEMPERATURE: 97.2 F | DIASTOLIC BLOOD PRESSURE: 80 MMHG | SYSTOLIC BLOOD PRESSURE: 100 MMHG | BODY MASS INDEX: 30.02 KG/M2

## 2024-02-16 DIAGNOSIS — E78.5 HYPERLIPIDEMIA, UNSPECIFIED HYPERLIPIDEMIA TYPE: Primary | ICD-10-CM

## 2024-02-16 PROCEDURE — 1036F TOBACCO NON-USER: CPT | Performed by: INTERNAL MEDICINE

## 2024-02-16 PROCEDURE — G8417 CALC BMI ABV UP PARAM F/U: HCPCS | Performed by: INTERNAL MEDICINE

## 2024-02-16 PROCEDURE — G8427 DOCREV CUR MEDS BY ELIG CLIN: HCPCS | Performed by: INTERNAL MEDICINE

## 2024-02-16 PROCEDURE — 3017F COLORECTAL CA SCREEN DOC REV: CPT | Performed by: INTERNAL MEDICINE

## 2024-02-16 PROCEDURE — G8482 FLU IMMUNIZE ORDER/ADMIN: HCPCS | Performed by: INTERNAL MEDICINE

## 2024-02-16 PROCEDURE — 99213 OFFICE O/P EST LOW 20 MIN: CPT | Performed by: INTERNAL MEDICINE

## 2024-02-16 RX ORDER — ATORVASTATIN CALCIUM 10 MG/1
10 TABLET, FILM COATED ORAL DAILY
COMMUNITY
Start: 2024-02-14 | End: 2024-03-15

## 2024-02-16 RX ORDER — BIMATOPROST 3 UG/ML
SOLUTION TOPICAL
COMMUNITY
Start: 2024-01-15

## 2024-02-16 NOTE — PROGRESS NOTES
Patient:  Radha Asif  MRN: [id number]  Date of Service: 2024   1965      CHIEF COMPLAINT:    Chief Complaint   Patient presents with    3 Month Follow-Up       History Obtained From:  patient    HISTORY OF PRESENT ILLNESS:   The patient is a 58 y.o. female with prior history of Hyperlipidemia is here for a routine check up.She is tolerating the Crestor well.  No muscle cramps reported.    Past medical, surgical and family history reviewed and updated.  Medications, allergies, and social history reviewed and updated.     ROS:  Negative     Physical Exam:      General appearance: alert, appears stated age, and cooperative  Vitals:   Vitals:    24 1421   BP: 100/80   Pulse: 53   Temp: 97.2 °F (36.2 °C)   TempSrc: Temporal   SpO2: 94%   Weight: 84.7 kg (186 lb 12.8 oz)   Height: 1.676 m (5' 6\")     Weight:   Wt Readings from Last 5 Encounters:   24 84.7 kg (186 lb 12.8 oz)   24 81.6 kg (180 lb)   01/15/24 81.6 kg (180 lb)   12/15/23 81.6 kg (180 lb)   23 81.6 kg (180 lb)      Skin: Skin color, texture, turgor normal. No rashes or lesions.  HEENT: Head: Normocephalic, no lesions, without obvious abnormality.  Head: Normal, normocephalic, atraumatic.  Eye: Normal external eye, conjunctiva, lids cornea, ROSCOE.  Ears: Normal TM's bilaterally. Normal auditory canals and external ears. Non-tender.  Pharynx: Dental Hygiene adequate. Normal buccal mucosa. Normal pharynx.  Neck / Thyroid: Supple, no masses, nodes, nodules or enlargement.  Neck: no adenopathy, no carotid bruit, no JVD, supple, symmetrical, trachea midline, and thyroid not enlarged, symmetric, no tenderness/mass/nodules  Lungs: clear to auscultation bilaterally  Heart: regular rate and rhythm, S1, S2 normal, no murmur, click, rub or gallop  Abdomen: soft, non-tender; bowel sounds normal; no masses,  no organomegaly  Extremities: extremities normal, atraumatic, no cyanosis or edema  Neurologic: Mental status: Alert, oriented,

## 2024-02-19 RX ORDER — ROSUVASTATIN CALCIUM 10 MG/1
TABLET, COATED ORAL
Qty: 90 TABLET | Refills: 1 | Status: SHIPPED | OUTPATIENT
Start: 2024-02-19

## 2024-02-19 NOTE — TELEPHONE ENCOUNTER
Last Appointment:  2/16/2024  Future Appointments   Date Time Provider Department Center   5/20/2024  3:20 PM Guille Hagan MD [facility]   7/3/2024  3:00 PM Antonio, Fiona A, APRN - CNP [facility]   7/30/2024  1:00 PM Joel Anna APRN - CNP [facility]

## 2024-02-28 ENCOUNTER — TELEPHONE (OUTPATIENT)
Dept: ENT CLINIC | Age: 59
End: 2024-02-28

## 2024-02-28 RX ORDER — AZELASTINE 1 MG/ML
2 SPRAY, METERED NASAL 2 TIMES DAILY
Qty: 90 ML | Refills: 3 | Status: SHIPPED | OUTPATIENT
Start: 2024-02-28

## 2024-03-19 ENCOUNTER — TELEPHONE (OUTPATIENT)
Dept: ENT CLINIC | Age: 59
End: 2024-03-19

## 2024-03-19 NOTE — TELEPHONE ENCOUNTER
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 Patient stated she is not having any real vertigo recently but admits to feeling dizzy when she turns her head fast. Patient stated her work is making her make up the physical part of the certification along with this months portion as well and is concerned for it to induce vertigo symptoms. Per TOMMIE Anna patient was advised if she is not having vertigo recently she is to complete all work requirements that are due and if vertigo is induced she can be scheduled for an appt and evaluated at that point. TOMMIE Anna does not want to write a letter at this time.     Electronically signed by Jessica Winter MA on 3/19/24 at 1:57 PM EDT

## 2024-04-29 NOTE — TELEPHONE ENCOUNTER
Spoke with Dr Kim Carranza, due to no availability patient was offered an appointment on 7/27. Patient stated she is leaving for [facility] on 7/23 and would not be able to take that appointment. Dr. Kim Carranza recommended contacting patient's PCP or Dr. Ferny Padilla to see if they can get her in before she leaves. Patient stated she is using her flonase.      Electronically signed by Ilya Serna MA on 7/16/21 at 9:59 AM EDT English

## 2024-05-17 ASSESSMENT — PATIENT HEALTH QUESTIONNAIRE - PHQ9
8. MOVING OR SPEAKING SO SLOWLY THAT OTHER PEOPLE COULD HAVE NOTICED. OR THE OPPOSITE - BEING SO FIDGETY OR RESTLESS THAT YOU HAVE BEEN MOVING AROUND A LOT MORE THAN USUAL: NOT AT ALL
SUM OF ALL RESPONSES TO PHQ9 QUESTIONS 1 & 2: 0
5. POOR APPETITE OR OVEREATING: NOT AT ALL
6. FEELING BAD ABOUT YOURSELF - OR THAT YOU ARE A FAILURE OR HAVE LET YOURSELF OR YOUR FAMILY DOWN: NOT AT ALL
SUM OF ALL RESPONSES TO PHQ QUESTIONS 1-9: 0
2. FEELING DOWN, DEPRESSED OR HOPELESS: NOT AT ALL
9. THOUGHTS THAT YOU WOULD BE BETTER OFF DEAD, OR OF HURTING YOURSELF: NOT AT ALL
SUM OF ALL RESPONSES TO PHQ QUESTIONS 1-9: 0
6. FEELING BAD ABOUT YOURSELF - OR THAT YOU ARE A FAILURE OR HAVE LET YOURSELF OR YOUR FAMILY DOWN: NOT AT ALL
1. LITTLE INTEREST OR PLEASURE IN DOING THINGS: NOT AT ALL
10. IF YOU CHECKED OFF ANY PROBLEMS, HOW DIFFICULT HAVE THESE PROBLEMS MADE IT FOR YOU TO DO YOUR WORK, TAKE CARE OF THINGS AT HOME, OR GET ALONG WITH OTHER PEOPLE: NOT DIFFICULT AT ALL
4. FEELING TIRED OR HAVING LITTLE ENERGY: NOT AT ALL
3. TROUBLE FALLING OR STAYING ASLEEP: NOT AT ALL
9. THOUGHTS THAT YOU WOULD BE BETTER OFF DEAD, OR OF HURTING YOURSELF: NOT AT ALL
2. FEELING DOWN, DEPRESSED OR HOPELESS: NOT AT ALL
5. POOR APPETITE OR OVEREATING: NOT AT ALL
8. MOVING OR SPEAKING SO SLOWLY THAT OTHER PEOPLE COULD HAVE NOTICED. OR THE OPPOSITE, BEING SO FIGETY OR RESTLESS THAT YOU HAVE BEEN MOVING AROUND A LOT MORE THAN USUAL: NOT AT ALL
1. LITTLE INTEREST OR PLEASURE IN DOING THINGS: NOT AT ALL
SUM OF ALL RESPONSES TO PHQ QUESTIONS 1-9: 0
7. TROUBLE CONCENTRATING ON THINGS, SUCH AS READING THE NEWSPAPER OR WATCHING TELEVISION: NOT AT ALL
4. FEELING TIRED OR HAVING LITTLE ENERGY: NOT AT ALL
10. IF YOU CHECKED OFF ANY PROBLEMS, HOW DIFFICULT HAVE THESE PROBLEMS MADE IT FOR YOU TO DO YOUR WORK, TAKE CARE OF THINGS AT HOME, OR GET ALONG WITH OTHER PEOPLE: NOT DIFFICULT AT ALL
7. TROUBLE CONCENTRATING ON THINGS, SUCH AS READING THE NEWSPAPER OR WATCHING TELEVISION: NOT AT ALL
SUM OF ALL RESPONSES TO PHQ QUESTIONS 1-9: 0
3. TROUBLE FALLING OR STAYING ASLEEP: NOT AT ALL
SUM OF ALL RESPONSES TO PHQ QUESTIONS 1-9: 0

## 2024-05-18 ENCOUNTER — HOSPITAL ENCOUNTER (OUTPATIENT)
Age: 59
Discharge: HOME | End: 2024-05-18
Payer: COMMERCIAL

## 2024-05-18 LAB
ALBUMIN SERPL-MCNC: 4.6 G/DL (ref 3.5–5.2)
ALP SERPL-CCNC: 71 U/L (ref 35–104)
ALT SERPL-CCNC: 13 U/L (ref 0–32)
ANION GAP SERPL CALCULATED.3IONS-SCNC: 9 MMOL/L (ref 7–16)
AST SERPL-CCNC: 21 U/L (ref 0–31)
BASOPHILS # BLD: 0.04 K/UL (ref 0–0.2)
BASOPHILS NFR BLD: 1 % (ref 0–2)
BILIRUB SERPL-MCNC: 0.4 MG/DL (ref 0–1.2)
BUN SERPL-MCNC: 16 MG/DL (ref 6–20)
CALCIUM SERPL-MCNC: 9.3 MG/DL (ref 8.6–10.2)
CHLORIDE SERPL-SCNC: 106 MMOL/L (ref 98–107)
CHOLEST SERPL-MCNC: 164 MG/DL
CO2 SERPL-SCNC: 24 MMOL/L (ref 22–29)
CREAT SERPL-MCNC: 0.9 MG/DL (ref 0.5–1)
EOSINOPHIL # BLD: 0.13 K/UL (ref 0.05–0.5)
EOSINOPHILS RELATIVE PERCENT: 3 % (ref 0–6)
ERYTHROCYTE [DISTWIDTH] IN BLOOD BY AUTOMATED COUNT: 13.2 % (ref 11.5–15)
GFR, ESTIMATED: 79 ML/MIN/1.73M2
GLUCOSE SERPL-MCNC: 77 MG/DL (ref 74–99)
HBA1C MFR BLD: 5.3 % (ref 4–5.6)
HCT VFR BLD AUTO: 44.5 % (ref 34–48)
HDLC SERPL-MCNC: 71 MG/DL
HGB BLD-MCNC: 14 G/DL (ref 11.5–15.5)
IMM GRANULOCYTES # BLD AUTO: <0.03 K/UL (ref 0–0.58)
IMM GRANULOCYTES NFR BLD: 0 % (ref 0–5)
LDLC SERPL CALC-MCNC: 78 MG/DL
LYMPHOCYTES NFR BLD: 2.07 K/UL (ref 1.5–4)
LYMPHOCYTES RELATIVE PERCENT: 40 % (ref 20–42)
MCH RBC QN AUTO: 29.9 PG (ref 26–35)
MCHC RBC AUTO-ENTMCNC: 31.5 G/DL (ref 32–34.5)
MCV RBC AUTO: 95.1 FL (ref 80–99.9)
MONOCYTES NFR BLD: 0.51 K/UL (ref 0.1–0.95)
MONOCYTES NFR BLD: 10 % (ref 2–12)
NEUTROPHILS NFR BLD: 47 % (ref 43–80)
NEUTS SEG NFR BLD: 2.41 K/UL (ref 1.8–7.3)
PLATELET # BLD AUTO: 235 K/UL (ref 130–450)
PMV BLD AUTO: 11.3 FL (ref 7–12)
POTASSIUM SERPL-SCNC: 4.2 MMOL/L (ref 3.5–5)
PROT SERPL-MCNC: 7.3 G/DL (ref 6.4–8.3)
RBC # BLD AUTO: 4.68 M/UL (ref 3.5–5.5)
SODIUM SERPL-SCNC: 139 MMOL/L (ref 132–146)
TRIGL SERPL-MCNC: 76 MG/DL
TSH SERPL DL<=0.05 MIU/L-ACNC: 2.7 UIU/ML (ref 0.27–4.2)
VLDLC SERPL CALC-MCNC: 15 MG/DL
WBC OTHER # BLD: 5.2 K/UL (ref 4.5–11.5)

## 2024-05-18 PROCEDURE — 80053 COMPREHEN METABOLIC PANEL: CPT

## 2024-05-18 PROCEDURE — 36415 COLL VENOUS BLD VENIPUNCTURE: CPT

## 2024-05-18 PROCEDURE — 85025 COMPLETE CBC W/AUTO DIFF WBC: CPT

## 2024-05-18 PROCEDURE — 83036 HEMOGLOBIN GLYCOSYLATED A1C: CPT

## 2024-05-18 PROCEDURE — 80061 LIPID PANEL: CPT

## 2024-05-18 PROCEDURE — 84443 ASSAY THYROID STIM HORMONE: CPT

## 2024-05-20 ENCOUNTER — OFFICE VISIT (OUTPATIENT)
Dept: FAMILY MEDICINE CLINIC | Age: 59
End: 2024-05-20
Payer: COMMERCIAL

## 2024-05-20 VITALS
TEMPERATURE: 97.2 F | HEIGHT: 66 IN | WEIGHT: 186.4 LBS | SYSTOLIC BLOOD PRESSURE: 100 MMHG | BODY MASS INDEX: 29.96 KG/M2 | HEART RATE: 61 BPM | DIASTOLIC BLOOD PRESSURE: 68 MMHG | OXYGEN SATURATION: 97 %

## 2024-05-20 DIAGNOSIS — Z12.11 ENCOUNTER FOR SCREENING COLONOSCOPY: Primary | ICD-10-CM

## 2024-05-20 DIAGNOSIS — E78.5 HYPERLIPIDEMIA, UNSPECIFIED HYPERLIPIDEMIA TYPE: Primary | ICD-10-CM

## 2024-05-20 PROCEDURE — G8417 CALC BMI ABV UP PARAM F/U: HCPCS | Performed by: INTERNAL MEDICINE

## 2024-05-20 PROCEDURE — 99213 OFFICE O/P EST LOW 20 MIN: CPT | Performed by: INTERNAL MEDICINE

## 2024-05-20 PROCEDURE — G8427 DOCREV CUR MEDS BY ELIG CLIN: HCPCS | Performed by: INTERNAL MEDICINE

## 2024-05-20 PROCEDURE — 3017F COLORECTAL CA SCREEN DOC REV: CPT | Performed by: INTERNAL MEDICINE

## 2024-05-20 PROCEDURE — 1036F TOBACCO NON-USER: CPT | Performed by: INTERNAL MEDICINE

## 2024-05-20 NOTE — PROGRESS NOTES
Patient:  Radha Asif  MRN: [id number]  Date of Service: 2024   1965      CHIEF COMPLAINT:    Chief Complaint   Patient presents with    3 Month Follow-Up     No new concerns       History Obtained From:  patient    HISTORY OF PRESENT ILLNESS:   The patient is a 58 y.o. female with prior history of Hyperlipidemia is here for a general check up.  No chest pain.No shortness of breath.No abd pain.  Past medical, surgical and family history reviewed and updated.  Medications, allergies, and social history reviewed and updated.     ROS:  Negative     Physical Exam:      General appearance: alert, appears stated age, and cooperative  Vitals:   Vitals:    24 1530   BP: 100/68   Pulse: 61   Temp: 97.2 °F (36.2 °C)   TempSrc: Temporal   SpO2: 97%   Weight: 84.6 kg (186 lb 6.4 oz)   Height: 1.676 m (5' 6\")     Weight:   Wt Readings from Last 5 Encounters:   24 84.6 kg (186 lb 6.4 oz)   24 84.7 kg (186 lb 12.8 oz)   24 81.6 kg (180 lb)   01/15/24 81.6 kg (180 lb)   12/15/23 81.6 kg (180 lb)      Skin: Skin color, texture, turgor normal. No rashes or lesions.  HEENT: Head: Normocephalic, no lesions, without obvious abnormality.  Head: Normal, normocephalic, atraumatic.  Eye: Normal external eye, conjunctiva, lids cornea, ROSCOE.  Ears: Normal TM's bilaterally. Normal auditory canals and external ears. Non-tender.  Pharynx: Dental Hygiene adequate. Normal buccal mucosa. Normal pharynx.  Neck / Thyroid: Supple, no masses, nodes, nodules or enlargement.  Neck: no adenopathy, no carotid bruit, no JVD, supple, symmetrical, trachea midline, and thyroid not enlarged, symmetric, no tenderness/mass/nodules  Lungs: clear to auscultation bilaterally  Heart: regular rate and rhythm, S1, S2 normal, no murmur, click, rub or gallop  Abdomen: soft, non-tender; bowel sounds normal; no masses,  no organomegaly  Extremities: extremities normal, atraumatic, no cyanosis or edema  Neurologic: Mental status: Alert,

## 2024-07-30 ENCOUNTER — OFFICE VISIT (OUTPATIENT)
Dept: ENT CLINIC | Age: 59
End: 2024-07-30
Payer: COMMERCIAL

## 2024-07-30 VITALS
WEIGHT: 176 LBS | BODY MASS INDEX: 28.28 KG/M2 | SYSTOLIC BLOOD PRESSURE: 118 MMHG | OXYGEN SATURATION: 98 % | HEART RATE: 56 BPM | DIASTOLIC BLOOD PRESSURE: 80 MMHG | HEIGHT: 66 IN

## 2024-07-30 DIAGNOSIS — R42 DIZZINESS: Primary | ICD-10-CM

## 2024-07-30 PROCEDURE — G8427 DOCREV CUR MEDS BY ELIG CLIN: HCPCS

## 2024-07-30 PROCEDURE — 3017F COLORECTAL CA SCREEN DOC REV: CPT

## 2024-07-30 PROCEDURE — 1036F TOBACCO NON-USER: CPT

## 2024-07-30 PROCEDURE — G8417 CALC BMI ABV UP PARAM F/U: HCPCS

## 2024-07-30 PROCEDURE — 99212 OFFICE O/P EST SF 10 MIN: CPT

## 2024-07-30 ASSESSMENT — ENCOUNTER SYMPTOMS
SINUS PRESSURE: 0
SORE THROAT: 0
EYE PAIN: 0
BACK PAIN: 0
VOMITING: 0
COUGH: 0
EYE DISCHARGE: 0
SHORTNESS OF BREATH: 0
DIARRHEA: 0
ALLERGIC/IMMUNOLOGIC NEGATIVE: 1
RHINORRHEA: 0

## 2024-07-30 NOTE — PROGRESS NOTES
Subjective:      Patient ID:  Radha Asif is a 59 y.o. female.    HPI Comments: Pt returns for a return of dizziness.  They have been free from dizziness for 6 month(s).     Spinning: no  Hearing loss: no Fluctuating: no  Aural pressure: no  Tinnitus: no  Otalgia:no    States she has not had the vertigo but does feel \"dizzy\" when rising form sitting to standing.     Past Medical History:   Diagnosis Date    Abdominal cramping     Anxiety     Asthma     controlled per patient    Constipation     Delivery normal     x2    Depression     Left arm pain     had cardiac work-up 2012? 3/16/15 -resolved    SOB (shortness of breath)     resolved     Past Surgical History:   Procedure Laterality Date    BREAST BIOPSY Right     benign    BREAST SURGERY Right 1980    cyst    COLONOSCOPY  03/2015    Dr. Dorantes, repeat in 10 years    CYST REMOVAL      tailbone, face    DILATION AND CURETTAGE  09/06/2022    [facility] polypectomy/myomectomy - Myosure-Reach    FACIAL COSMETIC SURGERY      TUBAL LIGATION  1995     Family History   Problem Relation Age of Onset    Heart Attack Father     Coronary Art Dis Father     Breast Cancer Paternal Grandmother      Social History     Socioeconomic History    Marital status: Single     Spouse name: None    Number of children: None    Years of education: None    Highest education level: None   Tobacco Use    Smoking status: Never     Passive exposure: Past    Smokeless tobacco: Never   Vaping Use    Vaping Use: Never used   Substance and Sexual Activity    Alcohol use: Not Currently     Alcohol/week: 1.0 standard drink of alcohol     Types: 1 Standard drinks or equivalent per week     Comment: Social use    Drug use: Never    Sexual activity: Not Currently     Partners: Male     Birth control/protection: Surgical     Comment: btl     Social Determinants of Health     Financial Resource Strain: Low Risk  (7/28/2023)    Overall Financial Resource Strain (CARDIA)     Difficulty of Paying Living

## 2024-08-07 ENCOUNTER — HOSPITAL ENCOUNTER (OUTPATIENT)
Dept: GENERAL RADIOLOGY | Age: 59
Discharge: HOME | End: 2024-08-09
Payer: COMMERCIAL

## 2024-08-07 VITALS — HEIGHT: 66 IN | WEIGHT: 170 LBS | BODY MASS INDEX: 27.32 KG/M2

## 2024-08-07 DIAGNOSIS — Z12.31 SCREENING MAMMOGRAM FOR BREAST CANCER: ICD-10-CM

## 2024-08-07 PROCEDURE — 77063 BREAST TOMOSYNTHESIS BI: CPT

## 2024-09-13 ENCOUNTER — HOSPITAL ENCOUNTER (OUTPATIENT)
Age: 59
Discharge: HOME | End: 2024-09-15
Payer: COMMERCIAL

## 2024-09-13 ENCOUNTER — OFFICE VISIT (OUTPATIENT)
Dept: FAMILY MEDICINE CLINIC | Age: 59
End: 2024-09-13

## 2024-09-13 ENCOUNTER — HOSPITAL ENCOUNTER (OUTPATIENT)
Dept: GENERAL RADIOLOGY | Age: 59
Discharge: HOME | End: 2024-09-15
Payer: COMMERCIAL

## 2024-09-13 VITALS
OXYGEN SATURATION: 98 % | HEART RATE: 66 BPM | HEIGHT: 66 IN | BODY MASS INDEX: 27.48 KG/M2 | SYSTOLIC BLOOD PRESSURE: 112 MMHG | WEIGHT: 171 LBS | TEMPERATURE: 97.7 F | DIASTOLIC BLOOD PRESSURE: 70 MMHG
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
## 2024-09-13 DIAGNOSIS — E78.5 HYPERLIPIDEMIA, UNSPECIFIED HYPERLIPIDEMIA TYPE: Primary | ICD-10-CM

## 2024-09-13 DIAGNOSIS — Z23 NEED FOR INFLUENZA VACCINATION: ICD-10-CM

## 2024-09-13 PROCEDURE — 72114 X-RAY EXAM L-S SPINE BENDING: CPT

## 2024-09-13 PROCEDURE — 72170 X-RAY EXAM OF PELVIS: CPT

## 2024-09-13 SDOH — ECONOMIC STABILITY: FOOD INSECURITY: WITHIN THE PAST 12 MONTHS, THE FOOD YOU BOUGHT JUST DIDN'T LAST AND YOU DIDN'T HAVE MONEY TO GET MORE.: NEVER TRUE

## 2024-09-13 SDOH — ECONOMIC STABILITY: INCOME INSECURITY: HOW HARD IS IT FOR YOU TO PAY FOR THE VERY BASICS LIKE FOOD, HOUSING, MEDICAL CARE, AND HEATING?: NOT HARD AT ALL

## 2024-09-13 SDOH — ECONOMIC STABILITY: FOOD INSECURITY: WITHIN THE PAST 12 MONTHS, YOU WORRIED THAT YOUR FOOD WOULD RUN OUT BEFORE YOU GOT MONEY TO BUY MORE.: NEVER TRUE

## 2024-12-12 ENCOUNTER — HOSPITAL ENCOUNTER (OUTPATIENT)
Age: 59
Discharge: HOME | End: 2024-12-12
Payer: COMMERCIAL

## 2024-12-12 ENCOUNTER — TELEPHONE (OUTPATIENT)
Dept: SURGERY | Age: 59
End: 2024-12-12

## 2024-12-12 ENCOUNTER — OFFICE VISIT (OUTPATIENT)
Dept: SURGERY | Age: 59
End: 2024-12-12
Payer: COMMERCIAL

## 2024-12-12 VITALS
OXYGEN SATURATION: 99 % | SYSTOLIC BLOOD PRESSURE: 111 MMHG | HEART RATE: 52 BPM | HEIGHT: 66 IN | WEIGHT: 160 LBS | BODY MASS INDEX: 25.71 KG/M2 | DIASTOLIC BLOOD PRESSURE: 70 MMHG | TEMPERATURE: 96.6 F | RESPIRATION RATE: 18 BRPM

## 2024-12-12 DIAGNOSIS — K64.2 PROLAPSED INTERNAL HEMORRHOIDS, GRADE 3: Primary | ICD-10-CM

## 2024-12-12 DIAGNOSIS — K59.09 OTHER CONSTIPATION: ICD-10-CM

## 2024-12-12 DIAGNOSIS — K62.89 ANAL OR RECTAL PAIN: ICD-10-CM

## 2024-12-12 DIAGNOSIS — E78.5 HYPERLIPIDEMIA, UNSPECIFIED HYPERLIPIDEMIA TYPE: ICD-10-CM

## 2024-12-12 LAB
ALBUMIN SERPL-MCNC: 4.1 G/DL (ref 3.5–5.2)
ALP SERPL-CCNC: 51 U/L (ref 35–104)
ALT SERPL-CCNC: 26 U/L (ref 0–32)
ANION GAP SERPL CALCULATED.3IONS-SCNC: 10 MMOL/L (ref 7–16)
AST SERPL-CCNC: 31 U/L (ref 0–31)
BILIRUB SERPL-MCNC: 0.4 MG/DL (ref 0–1.2)
BUN SERPL-MCNC: 15 MG/DL (ref 6–23)
CALCIUM SERPL-MCNC: 9.2 MG/DL (ref 8.6–10.2)
CHLORIDE SERPL-SCNC: 104 MMOL/L (ref 98–107)
CHOLEST SERPL-MCNC: 171 MG/DL
CO2 SERPL-SCNC: 25 MMOL/L (ref 22–29)
CREAT SERPL-MCNC: 0.7 MG/DL (ref 0.5–1)
GFR, ESTIMATED: >90 ML/MIN/1.73M2
GLUCOSE SERPL-MCNC: 80 MG/DL (ref 74–99)
HDLC SERPL-MCNC: 61 MG/DL
LDLC SERPL CALC-MCNC: 92 MG/DL
POTASSIUM SERPL-SCNC: 3.7 MMOL/L (ref 3.5–5)
PROT SERPL-MCNC: 6.7 G/DL (ref 6.4–8.3)
SODIUM SERPL-SCNC: 139 MMOL/L (ref 132–146)
TRIGL SERPL-MCNC: 91 MG/DL
VLDLC SERPL CALC-MCNC: 18 MG/DL

## 2024-12-12 PROCEDURE — G8427 DOCREV CUR MEDS BY ELIG CLIN: HCPCS | Performed by: SURGERY

## 2024-12-12 PROCEDURE — 1036F TOBACCO NON-USER: CPT | Performed by: SURGERY

## 2024-12-12 PROCEDURE — 3017F COLORECTAL CA SCREEN DOC REV: CPT | Performed by: SURGERY

## 2024-12-12 PROCEDURE — 80061 LIPID PANEL: CPT

## 2024-12-12 PROCEDURE — 80053 COMPREHEN METABOLIC PANEL: CPT

## 2024-12-12 PROCEDURE — G8484 FLU IMMUNIZE NO ADMIN: HCPCS | Performed by: SURGERY

## 2024-12-12 PROCEDURE — 99203 OFFICE O/P NEW LOW 30 MIN: CPT | Performed by: SURGERY

## 2024-12-12 PROCEDURE — G8417 CALC BMI ABV UP PARAM F/U: HCPCS | Performed by: SURGERY

## 2024-12-12 RX ORDER — SODIUM CHLORIDE 9 MG/ML
INJECTION, SOLUTION INTRAVENOUS CONTINUOUS
OUTPATIENT
Start: 2024-12-12

## 2024-12-12 NOTE — PATIENT INSTRUCTIONS
your colon. This helps your doctor be able to see inside your colon during the test.  How do you prepare for the procedure?  Procedures can be stressful. This information will help you understand what you can expect. And it will help you safely prepare for your procedure.  Preparing for the procedure    Be sure you have someone to take you home. Anesthesia and pain medicine will make it unsafe for you to drive or get home on your own.    Understand exactly what procedure is planned, along with the risks, benefits, and other options.     Tell your doctor ALL the medicines, vitamins, supplements, and herbal remedies you take. Some may increase the risk of problems during your procedure. Your doctor will tell you if you should stop taking any of them before the procedure and how soon to do it.     If you take a medicine that prevents blood clots, your doctor may tell you to stop taking it before your procedure. Or your doctor may tell you to keep taking it. (These medicines include aspirin and other blood thinners.) Make sure that you understand exactly what your doctor wants you to do.     Make sure your doctor and the hospital have a copy of your advance directive. If you don't have one, you may want to prepare one. It lets others know your health care wishes. It's a good thing to have before any type of surgery or procedure.   Before the procedure    Follow your doctor's directions about when to stop eating solid foods and drink only clear liquids. You can drink water, clear juices, clear broths, flavored ice pops, and gelatin (such as Jell-O). Do not eat or drink anything red or purple. This includes grape juice and grape-flavored ice pops. It also includes fruit punch and cherry gelatin.     Drink the \"colon prep\" liquid as your doctor tells you. You will want to stay home, because the liquid will make you go to the bathroom a lot. Your stools will be loose and watery. It's very important to drink all of the

## 2024-12-12 NOTE — TELEPHONE ENCOUNTER
Prior Authorization Form:      DEMOGRAPHICS:                     Patient Name:  Radha Asif  Patient :  1965            Insurance:  Payor: [employer] / Plan: [employer] - CHOICE PLUS / Product Type: *No Product type* /   Insurance ID Number:    Payer/Plan Subscr  Sex Relation Sub. Ins. ID Effective Group Num   1. [facility]* RADHA ASIF 1965 Female Self [id number]                                   [address] [address]         DIAGNOSIS & PROCEDURE:                       Procedure/Operation: COLONOSCOPY W/HEMORRHOID BANDING           CPT Code: 75662    Diagnosis:  RECTAL PAIN    ICD10 Code: K62.9    Location:  [facility]    Surgeon:  CYNDIE    SCHEDULING INFORMATION:                          Date: 2025    Time: 9:30 AM              Anesthesia: LMAC                                                       Status:  Outpatient        Special Comments:         Electronically signed by Radha Castillo MA on 2024 at 3:23 PM

## 2024-12-12 NOTE — PROGRESS NOTES
(ANTACID) 750 MG chewable tablet Take 1 tablet by mouth daily      progesterone (PROMETRIUM) 200 MG CAPS capsule Take 1 capsule by mouth nightly 90 capsule 3    estradiol (VIVELLE-DOT) 0.025 MG/24HR PTTW Place 1 patch onto the skin Twice a Week 24 patch 3    azelastine (ASTELIN) 0.1 % nasal spray 2 sprays by Nasal route 2 times daily Use in each nostril as directed 90 mL 3    rosuvastatin (CRESTOR) 10 MG tablet TAKE 1 TABLET NIGHTLY 90 tablet 1    fluticasone (FLONASE) 50 MCG/ACT nasal spray SHAKE LIQUID AND USE 2 SPRAYS IN EACH NOSTRIL DAILY 48 g 3    albuterol sulfate HFA (PROVENTIL;VENTOLIN;PROAIR) 108 (90 Base) MCG/ACT inhaler Inhale 1 puff into the lungs every 6 hours as needed for Wheezing (as needed for wheezing) 2 each 3    bimatoprost 0.03 % SOLN place one drop on applicator and apply evenly along both of the upper eyelids at base of eyelashes once daily.       No current facility-administered medications for this visit.         Social History:   Social History     Tobacco Use    Smoking status: Never     Passive exposure: Past    Smokeless tobacco: Never   Substance Use Topics    Alcohol use: Not Currently     Alcohol/week: 1.0 standard drink of alcohol     Types: 1 Standard drinks or equivalent per week     Comment: Social use        Family History:   Family History   Problem Relation Age of Onset    Heart Attack Father     Coronary Art Dis Father     Breast Cancer Paternal Grandmother        REVIEW OF SYSTEMS:    Constitutional: negative  Eyes: negative  Ears, nose, mouth, throat, and face: negative  Respiratory: negative  Cardiovascular: negative  Gastrointestinal: as in HPI  Genitourinary:negative  Integument/breast: negative  Hematologic/lymphatic: negative  Musculoskeletal:negative  Neurological: negative  Allergic/Immunologic: negative    PHYSICAL EXAM   /70   Pulse 52   Temp (!) 96.6 °F (35.9 °C)   Resp 18   Ht 1.676 m (5' 6\")   Wt 72.6 kg (160 lb)   LMP 05/15/2020   SpO2 99%   BMI

## 2024-12-20 ENCOUNTER — OFFICE VISIT (OUTPATIENT)
Dept: FAMILY MEDICINE CLINIC | Age: 59
End: 2024-12-20
Payer: COMMERCIAL

## 2024-12-20 VITALS
DIASTOLIC BLOOD PRESSURE: 68 MMHG | TEMPERATURE: 97.8 F | WEIGHT: 160 LBS | HEART RATE: 62 BPM | BODY MASS INDEX: 25.82 KG/M2 | SYSTOLIC BLOOD PRESSURE: 124 MMHG | OXYGEN SATURATION: 96 %

## 2024-12-20 DIAGNOSIS — E78.5 HYPERLIPIDEMIA, UNSPECIFIED HYPERLIPIDEMIA TYPE: Primary | ICD-10-CM

## 2024-12-20 PROCEDURE — 99213 OFFICE O/P EST LOW 20 MIN: CPT | Performed by: INTERNAL MEDICINE

## 2024-12-20 PROCEDURE — G8484 FLU IMMUNIZE NO ADMIN: HCPCS | Performed by: INTERNAL MEDICINE

## 2024-12-20 PROCEDURE — 1036F TOBACCO NON-USER: CPT | Performed by: INTERNAL MEDICINE

## 2024-12-20 PROCEDURE — 3017F COLORECTAL CA SCREEN DOC REV: CPT | Performed by: INTERNAL MEDICINE

## 2024-12-20 PROCEDURE — G8427 DOCREV CUR MEDS BY ELIG CLIN: HCPCS | Performed by: INTERNAL MEDICINE

## 2024-12-20 PROCEDURE — G8417 CALC BMI ABV UP PARAM F/U: HCPCS | Performed by: INTERNAL MEDICINE

## 2024-12-20 NOTE — PROGRESS NOTES
thought content appropriate    Labs:  CBC:   Lab Results   Component Value Date/Time    WBC 5.2 05/18/2024 11:11 AM    RBC 4.68 05/18/2024 11:11 AM    HGB 14.0 05/18/2024 11:11 AM    HCT 44.5 05/18/2024 11:11 AM    MCV 95.1 05/18/2024 11:11 AM    MCH 29.9 05/18/2024 11:11 AM    MCHC 31.5 05/18/2024 11:11 AM    RDW 13.2 05/18/2024 11:11 AM     05/18/2024 11:11 AM    MPV 11.3 05/18/2024 11:11 AM     WBC:    Lab Results   Component Value Date/Time    WBC 5.2 05/18/2024 11:11 AM     Platelets:    Lab Results   Component Value Date/Time     05/18/2024 11:11 AM     Hemoglobin/Hematocrit:    Lab Results   Component Value Date/Time    HGB 14.0 05/18/2024 11:11 AM    HCT 44.5 05/18/2024 11:11 AM     CMP:    Lab Results   Component Value Date/Time     12/12/2024 11:06 AM    K 3.7 12/12/2024 11:06 AM    K 4.4 01/28/2021 07:36 PM     12/12/2024 11:06 AM    CO2 25 12/12/2024 11:06 AM    BUN 15 12/12/2024 11:06 AM    CREATININE 0.7 12/12/2024 11:06 AM    GFRAA >60 08/31/2022 12:00 PM    LABGLOM >90 12/12/2024 11:06 AM    LABGLOM >60 11/16/2023 06:43 AM    GLUCOSE 80 12/12/2024 11:06 AM    CALCIUM 9.2 12/12/2024 11:06 AM    BILITOT 0.4 12/12/2024 11:06 AM    ALKPHOS 51 12/12/2024 11:06 AM    AST 31 12/12/2024 11:06 AM    ALT 26 12/12/2024 11:06 AM     Sodium:    Lab Results   Component Value Date/Time     12/12/2024 11:06 AM     Potassium:    Lab Results   Component Value Date/Time    K 3.7 12/12/2024 11:06 AM    K 4.4 01/28/2021 07:36 PM     BUN/Creatinine:    Lab Results   Component Value Date/Time    BUN 15 12/12/2024 11:06 AM    CREATININE 0.7 12/12/2024 11:06 AM     Calcium:    Lab Results   Component Value Date/Time    CALCIUM 9.2 12/12/2024 11:06 AM     Magnesium:    Lab Results   Component Value Date/Time    MG 1.9 02/15/2024 04:58 PM     Uric Acid:    Lab Results   Component Value Date/Time    URICACID 4.7 07/06/2016 12:00 PM

## 2025-02-03 NOTE — PROGRESS NOTES
Patient's last dose of Tirzepatide was 02/02/2025. Patient will need to be rescheduled. Voicemail left with Tania at office.

## 2025-02-11 ENCOUNTER — OFFICE VISIT (OUTPATIENT)
Dept: ENT CLINIC | Age: 60
End: 2025-02-11
Payer: COMMERCIAL

## 2025-02-11 ENCOUNTER — PROCEDURE VISIT (OUTPATIENT)
Dept: AUDIOLOGY | Age: 60
End: 2025-02-11
Payer: COMMERCIAL

## 2025-02-11 VITALS
DIASTOLIC BLOOD PRESSURE: 69 MMHG | BODY MASS INDEX: 25.83 KG/M2 | TEMPERATURE: 97.5 F | RESPIRATION RATE: 14 BRPM | HEART RATE: 62 BPM | OXYGEN SATURATION: 100 % | HEIGHT: 65 IN | SYSTOLIC BLOOD PRESSURE: 103 MMHG | WEIGHT: 155 LBS

## 2025-02-11 DIAGNOSIS — R42 DIZZINESS: ICD-10-CM

## 2025-02-11 DIAGNOSIS — H93.13 TINNITUS OF BOTH EARS: ICD-10-CM

## 2025-02-11 DIAGNOSIS — R42 VERTIGO: Primary | ICD-10-CM

## 2025-02-11 DIAGNOSIS — J30.89 SEASONAL ALLERGIC RHINITIS DUE TO OTHER ALLERGIC TRIGGER: ICD-10-CM

## 2025-02-11 PROCEDURE — 92557 COMPREHENSIVE HEARING TEST: CPT

## 2025-02-11 PROCEDURE — 99213 OFFICE O/P EST LOW 20 MIN: CPT

## 2025-02-11 PROCEDURE — G8427 DOCREV CUR MEDS BY ELIG CLIN: HCPCS

## 2025-02-11 PROCEDURE — 3017F COLORECTAL CA SCREEN DOC REV: CPT

## 2025-02-11 PROCEDURE — G8417 CALC BMI ABV UP PARAM F/U: HCPCS

## 2025-02-11 PROCEDURE — 92567 TYMPANOMETRY: CPT

## 2025-02-11 PROCEDURE — 1036F TOBACCO NON-USER: CPT

## 2025-02-11 RX ORDER — FEXOFENADINE HCL 180 MG/1
180 TABLET ORAL DAILY
Qty: 30 TABLET | Refills: 1 | Status: SHIPPED | OUTPATIENT
Start: 2025-02-11

## 2025-02-11 ASSESSMENT — ENCOUNTER SYMPTOMS
BACK PAIN: 0
RHINORRHEA: 1
ALLERGIC/IMMUNOLOGIC NEGATIVE: 1
SHORTNESS OF BREATH: 0
VOMITING: 0
SINUS PRESSURE: 1
EYE PAIN: 0
DIARRHEA: 0
EYE DISCHARGE: 0
SORE THROAT: 0
COUGH: 0

## 2025-02-11 NOTE — PROGRESS NOTES
Subjective:      Patient ID:  Radha Asif is a 59 y.o. female.    HPI Comments: Pt returns for a return of dizziness.  They have been free from dizziness for 5.5 month(s).     Spinning: yes   Length of time: seconds  Hearing loss: yes Fluctuating: no  Aural pressure: yes  Tinnitus: yes  Otalgia:yes    Patient states about one week ago she woke up with room  spinning sensation.   States for the past 5-6 weeks she has noted increased nasal congestion, rhinorrhea, and PND.   States her ears have also felt full.     Past Medical History:   Diagnosis Date    Abdominal cramping     Anxiety     Asthma     controlled per patient    Constipation     Delivery normal     x2    Depression     Left arm pain     had cardiac work-up 2012? 3/16/15 -resolved    SOB (shortness of breath)     resolved     Past Surgical History:   Procedure Laterality Date    BREAST BIOPSY Right     benign    BREAST SURGERY Right 1980    cyst    COLONOSCOPY  03/2015    Dr. Dorantes, repeat in 10 years    CYST REMOVAL      tailbone, face    DILATION AND CURETTAGE  09/06/2022    [facility] polypectomy/myomectomy - Myosure-Reach    FACIAL COSMETIC SURGERY      TUBAL LIGATION  1995     Family History   Problem Relation Age of Onset    Heart Attack Father     Coronary Art Dis Father     Breast Cancer Paternal Grandmother      Social History     Socioeconomic History    Marital status: Single     Spouse name: None    Number of children: None    Years of education: None    Highest education level: None   Tobacco Use    Smoking status: Never     Passive exposure: Past    Smokeless tobacco: Never   Vaping Use    Vaping status: Never Used   Substance and Sexual Activity    Alcohol use: Not Currently     Alcohol/week: 1.0 standard drink of alcohol     Types: 1 Standard drinks or equivalent per week     Comment: Social use    Drug use: Never    Sexual activity: Not Currently     Partners: Male     Birth control/protection: Surgical     Comment: btl     Social

## 2025-02-11 NOTE — PROGRESS NOTES
This patient was referred for audiometric and tympanometric testing by SHAHIDA Yu due to dizziness.     Audiometry using pure tone air and bone conduction testing revealed hearing sensitivity within normal limits, bilaterally.  Reliability was good. Speech reception thresholds were in good agreement with the pure tone averages, bilaterally. Speech discrimination scores were excellent, bilaterally.    Tympanometry revealed normal middle ear peak pressure and compliance, bilaterally.    The results were reviewed with the patient and ordering provider.     Recommendations for follow up will be made pending ordering provider consult.    Hiwot Galeano/CCC-BENJAMIN  [place] Lic [id number]  Electronically signed by Hiwot Galeano on 2/11/2025 at 1:27 PM

## 2025-03-25 ENCOUNTER — OFFICE VISIT (OUTPATIENT)
Dept: ENT CLINIC | Age: 60
End: 2025-03-25
Payer: COMMERCIAL

## 2025-03-25 VITALS
BODY MASS INDEX: 30.33 KG/M2 | SYSTOLIC BLOOD PRESSURE: 92 MMHG | DIASTOLIC BLOOD PRESSURE: 60 MMHG | HEIGHT: 60 IN | WEIGHT: 154.5 LBS | HEART RATE: 54 BPM | OXYGEN SATURATION: 100 %

## 2025-03-25 DIAGNOSIS — R42 DIZZINESS: Primary | ICD-10-CM

## 2025-03-25 DIAGNOSIS — J30.89 SEASONAL ALLERGIC RHINITIS DUE TO OTHER ALLERGIC TRIGGER: ICD-10-CM

## 2025-03-25 PROCEDURE — G8427 DOCREV CUR MEDS BY ELIG CLIN: HCPCS

## 2025-03-25 PROCEDURE — G8417 CALC BMI ABV UP PARAM F/U: HCPCS

## 2025-03-25 PROCEDURE — 1036F TOBACCO NON-USER: CPT

## 2025-03-25 PROCEDURE — 3017F COLORECTAL CA SCREEN DOC REV: CPT

## 2025-03-25 PROCEDURE — 99213 OFFICE O/P EST LOW 20 MIN: CPT

## 2025-03-25 RX ORDER — M-VIT,TX,IRON,MINS/CALC/FOLIC 27MG-0.4MG
1 TABLET ORAL DAILY
COMMUNITY

## 2025-03-25 RX ORDER — FLUTICASONE PROPIONATE 50 MCG
SPRAY, SUSPENSION (ML) NASAL
Qty: 48 G | Refills: 3 | Status: SHIPPED | OUTPATIENT
Start: 2025-03-25

## 2025-03-25 RX ORDER — FEXOFENADINE HCL 180 MG/1
180 TABLET ORAL DAILY
Qty: 90 TABLET | Refills: 1 | Status: SHIPPED | OUTPATIENT
Start: 2025-03-25

## 2025-03-25 ASSESSMENT — ENCOUNTER SYMPTOMS
SHORTNESS OF BREATH: 0
SORE THROAT: 0
RHINORRHEA: 0
ALLERGIC/IMMUNOLOGIC NEGATIVE: 1
BACK PAIN: 0
SINUS PRESSURE: 1
EYE PAIN: 0
VOMITING: 0
COUGH: 0
DIARRHEA: 0
EYE DISCHARGE: 0

## 2025-03-25 NOTE — PROGRESS NOTES
Subjective:      Patient ID:  Radha Asif is a 59 y.o. female.    HPI:  Pt returns for recheck of allergies.       Nasal Steroid: yes   Name: fluticasone (Flonase)   Still taking: yes    Other therapy:   Astelin- yes - not using at this time  oral antihistamine- Allegra  leukotriene inhibitor- none  oral decongestant- none    which has been  somewhat effective     Pt has been on therapy for 6 week(s) and is doing adequately    States she does continue to have pressure behind her eyes.     The patient is complaining of  sinus pressure  and PND    The patients worst time of year is all seasons      Patient's medications, allergies, past medical, surgical, social and family histories were reviewed and updated as appropriate.        Review of Systems   Constitutional:  Negative for chills and fever.   HENT:  Positive for sinus pressure. Negative for congestion, ear discharge, ear pain, postnasal drip, rhinorrhea, sneezing and sore throat.    Eyes:  Negative for pain and discharge.   Respiratory:  Negative for cough and shortness of breath.    Cardiovascular:  Negative for chest pain.   Gastrointestinal:  Negative for diarrhea and vomiting.   Genitourinary:  Negative for flank pain.   Musculoskeletal:  Negative for back pain and neck pain.   Skin:  Negative for rash.   Allergic/Immunologic: Negative.    Neurological:  Negative for syncope and headaches.   All other systems reviewed and are negative.      Objective:     Vitals:    03/25/25 1322   BP: 92/60   Pulse: 54   SpO2: 100%     Physical Exam  Vitals reviewed.   Constitutional:       Appearance: Normal appearance.   HENT:      Head: Normocephalic and atraumatic.      Jaw: There is normal jaw occlusion. No tenderness.      Right Ear: Tympanic membrane, ear canal and external ear normal.      Left Ear: Tympanic membrane, ear canal and external ear normal.      Nose: Nose normal. No congestion.      Right Turbinates: Not swollen or pale.      Left Turbinates: Not

## 2025-03-25 NOTE — PROGRESS NOTES
[facility] PRE-ADMISSION TESTING INSTRUCTIONS: [phone]    The Preadmission Testing patient is instructed accordingly using the following criteria (check applicable):    ARRIVAL INSTRUCTIONS:     Arrival Time: 1000    [x] Parking the day of Surgery is located in the Main Entrance lot.  Upon entering through the main entrance (Entrance A) make an immediate right to the surgery reception desk    [x] Bring photo ID and insurance card    [x] Bring in a copy of Living will or Durable Power of  papers.    [x] Please be sure to arrange for a responsible adult to provide transportation to and from the hospital    [x] Please arrange for a responsible adult to be with you for the 24 hour period post procedure, due to having anesthesia    [x] Please notify surgeon if you develop any illness between now and time of surgery (cold, cough, sore throat, fever, nausea, vomiting) or any signs of infections  including skin, wounds, and dental.    [x] If you awake am of surgery not feeling well or have temperature >100 please call [phone].    GENERAL INSTRUCTIONS:    [x] No solid foods after midnight. You may only have up to 8oz of water from midnight until 4 hours prior to surgery. No gum, no candy, no mints.        [x] You may brush your teeth    [x] Take medications as instructed:     [x] Stop herbal supplements and vitamins 5 days prior to procedure    [x] Shower or bath with soap, lather and rinse well, AM of Surgery, no lotion, powders or creams to surgical site    [x] Please do not wear any nail polish, make up, hair products, body spray, aftershave, cologne or perfume on the day of surgery    [x] Jewelry, body piercings, eyeglasses, contact lenses and dentures are not permitted into surgery (bring cases)    [x] Follow bowel prep as instructed per surgeon    [x] No tobacco products within 24 hours of surgery     [x] No alcohol or illegal drug use, marijuana included, within 24 hours of  surgery.    [x] You can expect a call the business day prior to procedure to notify you if your arrival time changes    [x] If you receive a survey after surgery we would greatly appreciate your comments    [x]  The Outpatient Pharmacy is available to fill your prescription here on your day of surgery, ask your preop nurse for details        ALL PATIENT QUESTIONS ANSWERED AT THIS TIME.

## 2025-03-27 ENCOUNTER — ANESTHESIA EVENT (OUTPATIENT)
Dept: ENDOSCOPY | Age: 60
End: 2025-03-27
Payer: COMMERCIAL

## 2025-03-27 ASSESSMENT — LIFESTYLE VARIABLES: SMOKING_STATUS: 0

## 2025-03-27 ASSESSMENT — ENCOUNTER SYMPTOMS
DYSPNEA ACTIVITY LEVEL: NO INTERVAL CHANGE
SHORTNESS OF BREATH: 1

## 2025-03-27 NOTE — ANESTHESIA PRE PROCEDURE
Department of Anesthesiology  Preprocedure Note       Name:  Radha Asif   Age:  59 y.o.  :  1965                                          MRN:  [id number]         Date:  3/27/2025      Surgeon: Surgeon(s):  Millicent Dorantes MD    Procedure: Procedure(s):  COLONOSCOPY BAND LIGATION(S)    Medications prior to admission:   Prior to Admission medications    Medication Sig Start Date End Date Taking? Authorizing Provider   Multiple Vitamins-Minerals (THERAPEUTIC MULTIVITAMIN-MINERALS) tablet Take 1 tablet by mouth daily   Yes Deb Deng MD   Tirzepatide 5 MG/0.5ML SOAJ Inject into the skin once a week   Yes Deb Deng MD   fexofenadine (ALLEGRA) 180 MG tablet Take 1 tablet by mouth daily 3/25/25   Joel Anna APRN - CNP   fluticasone (FLONASE) 50 MCG/ACT nasal spray SHAKE LIQUID AND USE 2 SPRAYS IN EACH NOSTRIL DAILY 3/25/25   Joel Anna APRN - CNP   progesterone (PROMETRIUM) 200 MG CAPS capsule Take 1 capsule by mouth nightly 7/3/24   Fiona Alvarez APRN - CNP   estradiol (VIVELLE-DOT) 0.025 MG/24HR PTTW Place 1 patch onto the skin Twice a Week 24   Fiona Alvarez APRN - CNP   azelastine (ASTELIN) 0.1 % nasal spray 2 sprays by Nasal route 2 times daily Use in each nostril as directed  Patient not taking: Reported on 3/25/2025 2/28/24   Joel Anna APRN - CNP   rosuvastatin (CRESTOR) 10 MG tablet TAKE 1 TABLET NIGHTLY  Patient not taking: Reported on 3/25/2025 2/19/24   Guille Hagan MD   bimatoprost 0.03 % SOLN place one drop on applicator and apply evenly along both of the upper eyelids at base of eyelashes once daily.  Patient not taking: Reported on 3/25/2025 1/15/24   Deb Deng MD   albuterol sulfate HFA (PROVENTIL;VENTOLIN;PROAIR) 108 (90 Base) MCG/ACT inhaler Inhale 1 puff into the lungs every 6 hours as needed for Wheezing (as needed for wheezing) 22   Guille Hagan MD       Current medications:    No current facility-administered medications for

## 2025-03-28 ENCOUNTER — ANESTHESIA (OUTPATIENT)
Dept: ENDOSCOPY | Age: 60
End: 2025-03-28
Payer: COMMERCIAL

## 2025-03-28 ENCOUNTER — HOSPITAL ENCOUNTER (OUTPATIENT)
Age: 60
Setting detail: OUTPATIENT SURGERY
Discharge: HOME | End: 2025-03-28
Attending: SURGERY | Admitting: SURGERY
Payer: COMMERCIAL

## 2025-03-28 VITALS
RESPIRATION RATE: 20 BRPM | BODY MASS INDEX: 25.66 KG/M2 | WEIGHT: 154 LBS | HEART RATE: 63 BPM | HEIGHT: 65 IN | TEMPERATURE: 97 F | SYSTOLIC BLOOD PRESSURE: 115 MMHG | OXYGEN SATURATION: 100 % | DIASTOLIC BLOOD PRESSURE: 66 MMHG

## 2025-03-28 DIAGNOSIS — K62.89 ANAL OR RECTAL PAIN: Primary | ICD-10-CM

## 2025-03-28 PROCEDURE — 6370000000 HC RX 637 (ALT 250 FOR IP): Performed by: ANESTHESIOLOGY

## 2025-03-28 PROCEDURE — 2580000003 HC RX 258: Performed by: NURSE ANESTHETIST, CERTIFIED REGISTERED

## 2025-03-28 PROCEDURE — 7100000010 HC PHASE II RECOVERY - FIRST 15 MIN: Performed by: SURGERY

## 2025-03-28 PROCEDURE — 46221 LIGATION OF HEMORRHOID(S): CPT | Performed by: SURGERY

## 2025-03-28 PROCEDURE — 6360000002 HC RX W HCPCS: Performed by: ANESTHESIOLOGY

## 2025-03-28 PROCEDURE — 6360000002 HC RX W HCPCS: Performed by: NURSE ANESTHETIST, CERTIFIED REGISTERED

## 2025-03-28 PROCEDURE — 7100000011 HC PHASE II RECOVERY - ADDTL 15 MIN: Performed by: SURGERY

## 2025-03-28 PROCEDURE — 2709999900 HC NON-CHARGEABLE SUPPLY: Performed by: SURGERY

## 2025-03-28 PROCEDURE — 3700000000 HC ANESTHESIA ATTENDED CARE: Performed by: SURGERY

## 2025-03-28 PROCEDURE — 45378 DIAGNOSTIC COLONOSCOPY: CPT | Performed by: SURGERY

## 2025-03-28 PROCEDURE — 3609155100 HC COLONOSCOPY W/ BAND LIGATION(S): Performed by: SURGERY

## 2025-03-28 PROCEDURE — 3700000001 HC ADD 15 MINUTES (ANESTHESIA): Performed by: SURGERY

## 2025-03-28 RX ORDER — SODIUM CHLORIDE 9 MG/ML
INJECTION, SOLUTION INTRAVENOUS
Status: DISCONTINUED | OUTPATIENT
Start: 2025-03-28 | End: 2025-03-28 | Stop reason: SDUPTHER

## 2025-03-28 RX ORDER — FENTANYL CITRATE 50 UG/ML
50 INJECTION, SOLUTION INTRAMUSCULAR; INTRAVENOUS ONCE
Status: COMPLETED | OUTPATIENT
Start: 2025-03-28 | End: 2025-03-28

## 2025-03-28 RX ORDER — PROPOFOL 10 MG/ML
INJECTION, EMULSION INTRAVENOUS
Status: DISCONTINUED | OUTPATIENT
Start: 2025-03-28 | End: 2025-03-28 | Stop reason: SDUPTHER

## 2025-03-28 RX ORDER — SODIUM CHLORIDE 9 MG/ML
INJECTION, SOLUTION INTRAVENOUS CONTINUOUS
Status: DISCONTINUED | OUTPATIENT
Start: 2025-03-28 | End: 2025-03-28 | Stop reason: HOSPADM

## 2025-03-28 RX ORDER — HYDROCODONE BITARTRATE AND ACETAMINOPHEN 5; 325 MG/1; MG/1
TABLET ORAL
Status: DISCONTINUED
Start: 2025-03-28 | End: 2025-03-28 | Stop reason: HOSPADM

## 2025-03-28 RX ORDER — HYDROCODONE BITARTRATE AND ACETAMINOPHEN 5; 325 MG/1; MG/1
1 TABLET ORAL ONCE
Status: COMPLETED | OUTPATIENT
Start: 2025-03-28 | End: 2025-03-28

## 2025-03-28 RX ORDER — HYDROCODONE BITARTRATE AND ACETAMINOPHEN 5; 325 MG/1; MG/1
1 TABLET ORAL EVERY 4 HOURS PRN
Qty: 5 TABLET | Refills: 0 | Status: SHIPPED | OUTPATIENT
Start: 2025-03-28 | End: 2025-03-31

## 2025-03-28 RX ADMIN — FENTANYL CITRATE 50 MCG: 50 INJECTION INTRAMUSCULAR; INTRAVENOUS at 11:56

## 2025-03-28 RX ADMIN — PROPOFOL 300 MG: 10 INJECTION, EMULSION INTRAVENOUS at 11:23

## 2025-03-28 RX ADMIN — SODIUM CHLORIDE: 9 INJECTION, SOLUTION INTRAVENOUS at 11:05

## 2025-03-28 RX ADMIN — HYDROCODONE BITARTRATE AND ACETAMINOPHEN 1 TABLET: 5; 325 TABLET ORAL at 12:22

## 2025-03-28 ASSESSMENT — PAIN DESCRIPTION - LOCATION
LOCATION: RECTUM

## 2025-03-28 ASSESSMENT — PAIN SCALES - GENERAL
PAINLEVEL_OUTOF10: 5
PAINLEVEL_OUTOF10: 3
PAINLEVEL_OUTOF10: 8
PAINLEVEL_OUTOF10: 5

## 2025-03-28 ASSESSMENT — PAIN DESCRIPTION - DESCRIPTORS
DESCRIPTORS: ACHING
DESCRIPTORS: PRESSURE

## 2025-03-28 ASSESSMENT — PAIN DESCRIPTION - PAIN TYPE
TYPE: SURGICAL PAIN

## 2025-03-28 ASSESSMENT — PAIN DESCRIPTION - ORIENTATION
ORIENTATION: INNER

## 2025-03-28 ASSESSMENT — PAIN DESCRIPTION - FREQUENCY
FREQUENCY: CONTINUOUS

## 2025-03-28 ASSESSMENT — PAIN DESCRIPTION - ONSET: ONSET: AWAKENED FROM SLEEP

## 2025-03-28 NOTE — ANESTHESIA POSTPROCEDURE EVALUATION
Department of Anesthesiology  Postprocedure Note    Patient: Radha Asif  MRN: [id number]  YOB: 1965  Date of evaluation: 3/28/2025    Procedure Summary       Date: 03/28/25 Room / Location: [address]    Anesthesia Start: 1110 Anesthesia Stop: 1142    Procedure: COLONOSCOPY BAND LIGATION Diagnosis:       Anal or rectal pain      (Anal or rectal pain [K62.89])    Surgeons: Millicent Dorantes MD Responsible Provider: Yaw Quezada DO    Anesthesia Type: MAC ASA Status: 3            Anesthesia Type: No value filed.    Sun Phase I: Sun Score: 10    Sun Phase II:      Anesthesia Post Evaluation    Patient location during evaluation: PACU  Patient participation: complete - patient participated  Level of consciousness: awake and alert  Pain score: 0  Airway patency: patent  Nausea & Vomiting: no nausea and no vomiting  Cardiovascular status: blood pressure returned to baseline  Respiratory status: acceptable  Hydration status: euvolemic  Pain management: adequate and satisfactory to patient        No notable events documented.

## 2025-03-28 NOTE — H&P
Patient's office history and physical was reviewed.    Patient examined.    There has been no change in the patient's history and physical.      Physician Signature: Electronically signed by Dr. Millicent Dorantes      General Surgery History and Physical    Patient's Name/Date of Birth: Radha Asif / 1965    PCP: Guille Hagan MD    Referring Physician:   No ref. provider found  N/A    CHIEF COMPLAINT:    No chief complaint on file.        HISTORY OF PRESENT ILLNESS:    Radha Asif is an 59 y.o. female who presents with pain on her buttocks. She said she fell in August about 3 months ago. She said she has pain with BM now. She has rectal prolapse with BMs. She said it is just a small amount. She said she has a lot of discomfort when this happens. No bleeding. She has chronic constipation. She takes Magnesium. No nausea, vomiting, diarrhea. No changes in stool caliber. No bloody or black stools. No abdominal pain. No unintentional weight loss. NO family history of colon cancer. The patient has a known history of: no known risk factors. The patient has had a colonoscopy before - I did one about 10 years ago and it was normal.    Past Medical History:   Past Medical History:   Diagnosis Date    Abdominal cramping     Anxiety     Asthma     controlled per patient    Colon cancer screening     Constipation     Delivery normal     x2    Depression     Left arm pain     had cardiac work-up 2012? 3/16/15 -resolved    SOB (shortness of breath)     resolved        Past Surgical History:   Past Surgical History:   Procedure Laterality Date    BREAST BIOPSY Right     benign    BREAST SURGERY Right 1980    cyst    COLONOSCOPY  03/2015    Dr. Dorantes, repeat in 10 years    CYST REMOVAL      tailbone, face    DILATION AND CURETTAGE  09/06/2022    [facility] polypectomy/myomectomy - Myosure-Reach    FACIAL COSMETIC SURGERY      TUBAL LIGATION  1995        Allergies: Sulfa antibiotics and Vancomycin

## 2025-03-28 NOTE — OP NOTE
Operative Note: Colonoscopy with Internal Hemorrhoid Rubber Band Ligation    Radha Asif     DATE OF PROCEDURE: 3/28/2025  SURGEON: Dr. SAVITA AGEE MD, M.D.     PREOPERATIVE DIAGNOSES:    Rectal bleeding  Constipation    POSTOPERATIVE DIAGNOSES:  Enlarged internal hemorrhoids    SPECIMENS:  * No specimens in log *     OPERATION:   Colonoscopy to the cecum   Internal hemorrhoid rubber band ligation x 3     ANESTHESIA: LMAC    COMPLICATIONS: None.     BLOOD LOSS: Minimal    Procedure Note:    CONSENT AND INDICATIONS:  This is a 59 y.o. year old female who is having the above.  I have discussed with the patient and/or the patient representative the indication, alternatives, and the possible risks and/or complications of the planned procedure and the anesthesia methods. The patient and/or patient representative appear to understand and agree to proceed.    OPERATIONS: Bowel prep was done yesterday until the bowels were clear. The patient was placed on the table and sedated by anesthesia. A rectal exam was performed and no mass was felt. A lubricated scope was passed into the rectum which looked normal.  The scope was passed all the way around through the sigmoid, descending, transverse and ascending colon to the cecum. The bowel prep was mostly clear. No abnormalities were seen. The cecum was identified by the appendiceal orifice, ileocecal valve, and light reflex in the RLQ.The scope was then slowly withdrawn, each area was examined again on the way out.      The scope was retroflexed in the rectum and enlarged internal hemorrhoids were seen. The scope was then removed and a lubricated anoscope  was inserted. The patient was found to have enlarged hemorrhoids in the right anterior, right posterior, and left lateral positions.      The right anterior hemorrhoid was identified as the most inflamed. Using the rubber band ligator, 2 rubber bands were deployed at the base of the hemorrhoid. The same

## 2025-04-03 ENCOUNTER — HOSPITAL ENCOUNTER (OUTPATIENT)
Dept: GENERAL RADIOLOGY | Age: 60
Discharge: HOME | End: 2025-04-05
Payer: COMMERCIAL

## 2025-04-03 VITALS — HEIGHT: 66 IN | WEIGHT: 150 LBS | BODY MASS INDEX: 24.11 KG/M2

## 2025-04-03 DIAGNOSIS — N64.4 BREAST PAIN, RIGHT: ICD-10-CM

## 2025-04-03 PROCEDURE — G0279 TOMOSYNTHESIS, MAMMO: HCPCS

## 2025-06-20 ENCOUNTER — HOSPITAL ENCOUNTER (OUTPATIENT)
Age: 60
Discharge: HOME | End: 2025-06-20
Payer: COMMERCIAL

## 2025-06-20 LAB
ALBUMIN SERPL-MCNC: 4.5 G/DL (ref 3.5–5.2)
ALP SERPL-CCNC: 52 U/L (ref 35–104)
ALT SERPL-CCNC: 14 U/L (ref 0–32)
ANION GAP SERPL CALCULATED.3IONS-SCNC: 12 MMOL/L (ref 7–16)
AST SERPL-CCNC: 21 U/L (ref 0–31)
BILIRUB SERPL-MCNC: 0.4 MG/DL (ref 0–1.2)
BUN SERPL-MCNC: 20 MG/DL (ref 6–23)
CALCIUM SERPL-MCNC: 9.6 MG/DL (ref 8.6–10.2)
CHLORIDE SERPL-SCNC: 104 MMOL/L (ref 98–107)
CHOLEST SERPL-MCNC: 211 MG/DL
CO2 SERPL-SCNC: 25 MMOL/L (ref 22–29)
CREAT SERPL-MCNC: 0.8 MG/DL (ref 0.5–1)
GFR, ESTIMATED: 81 ML/MIN/1.73M2
GLUCOSE SERPL-MCNC: 81 MG/DL (ref 74–99)
HBA1C MFR BLD: 4.9 % (ref 4–5.6)
HDLC SERPL-MCNC: 74 MG/DL
LDLC SERPL CALC-MCNC: 126 MG/DL
POTASSIUM SERPL-SCNC: 4.1 MMOL/L (ref 3.5–5)
PROT SERPL-MCNC: 6.8 G/DL (ref 6.4–8.3)
SODIUM SERPL-SCNC: 141 MMOL/L (ref 132–146)
TRIGL SERPL-MCNC: 56 MG/DL
TSH SERPL DL<=0.05 MIU/L-ACNC: 3.92 UIU/ML (ref 0.27–4.2)
VLDLC SERPL CALC-MCNC: 11 MG/DL

## 2025-06-20 PROCEDURE — 80061 LIPID PANEL: CPT

## 2025-06-20 PROCEDURE — 83036 HEMOGLOBIN GLYCOSYLATED A1C: CPT

## 2025-06-20 PROCEDURE — 36415 COLL VENOUS BLD VENIPUNCTURE: CPT

## 2025-06-20 PROCEDURE — 80053 COMPREHEN METABOLIC PANEL: CPT

## 2025-06-20 PROCEDURE — 84443 ASSAY THYROID STIM HORMONE: CPT

## (undated) DEVICE — SPONGE GZ W4XL4IN RAYON POLY CVR W/NONWOVEN FAB STRL 2/PK

## (undated) DEVICE — GRADUATE TRIANG MEASURE 1000ML BLK PRNT